# Patient Record
Sex: MALE | Race: WHITE | NOT HISPANIC OR LATINO | Employment: PART TIME | URBAN - METROPOLITAN AREA
[De-identification: names, ages, dates, MRNs, and addresses within clinical notes are randomized per-mention and may not be internally consistent; named-entity substitution may affect disease eponyms.]

---

## 2017-01-13 ENCOUNTER — OFFICE VISIT (OUTPATIENT)
Dept: INTERNAL MEDICINE | Facility: CLINIC | Age: 27
End: 2017-01-13
Payer: COMMERCIAL

## 2017-01-13 VITALS
TEMPERATURE: 96 F | WEIGHT: 224 LBS | SYSTOLIC BLOOD PRESSURE: 116 MMHG | BODY MASS INDEX: 29.55 KG/M2 | DIASTOLIC BLOOD PRESSURE: 70 MMHG

## 2017-01-13 DIAGNOSIS — H66.004 RECURRENT ACUTE SUPPURATIVE OTITIS MEDIA OF RIGHT EAR WITHOUT SPONTANEOUS RUPTURE OF TYMPANIC MEMBRANE: Primary | ICD-10-CM

## 2017-01-13 PROCEDURE — 1159F MED LIST DOCD IN RCRD: CPT | Mod: S$GLB,,, | Performed by: FAMILY MEDICINE

## 2017-01-13 PROCEDURE — 99213 OFFICE O/P EST LOW 20 MIN: CPT | Mod: S$GLB,,, | Performed by: FAMILY MEDICINE

## 2017-01-13 PROCEDURE — 99999 PR PBB SHADOW E&M-EST. PATIENT-LVL III: CPT | Mod: PBBFAC,,, | Performed by: FAMILY MEDICINE

## 2017-01-13 RX ORDER — GUAIFENESIN, PSEUDOEPHEDRINE HYDROCHLORIDE 600; 60 MG/1; MG/1
1 TABLET, EXTENDED RELEASE ORAL
COMMUNITY
End: 2019-11-11

## 2017-01-13 NOTE — PROGRESS NOTES
Subjective:   Patient ID: Vasile Norris is a 27 y.o. male.  Chief Complaint:  Follow-up    HPI Comments: Presents for follow-up of recurrent suppurative otitis media, right-sided.  Here to document resolution or if chronic abnormality referral to ENT.  Completed all medications.  Did well.  Reports ear feels back to normal.  No new issues or complaints or concerns today.  Plans to schedule full physical exam in the future.    Review of Systems   Constitutional: Negative for chills, fatigue and fever.   HENT: Negative for congestion, dental problem, drooling, ear discharge, ear pain, facial swelling, hearing loss, mouth sores, nosebleeds, postnasal drip, rhinorrhea, sinus pressure, sneezing, sore throat, tinnitus, trouble swallowing and voice change.    Eyes: Negative for discharge, redness and visual disturbance.   Neurological: Negative for dizziness.     Objective:     Visit Vitals    /70 (BP Location: Right arm, Patient Position: Sitting, BP Method: Manual)    Temp 96.1 °F (35.6 °C) (Tympanic)    Wt 101.6 kg (223 lb 15.8 oz)    BMI 29.55 kg/m2     Physical Exam   Constitutional: Vital signs are normal. He appears well-developed and well-nourished. No distress.   HENT:   Right Ear: Hearing and external ear normal. No drainage, swelling or tenderness. Tympanic membrane is scarred. Tympanic membrane is not injected, not erythematous, not retracted and not bulging. No middle ear effusion. No decreased hearing is noted.   Left Ear: Hearing, tympanic membrane, external ear and ear canal normal.   Nose: Nose normal. Right sinus exhibits no maxillary sinus tenderness and no frontal sinus tenderness. Left sinus exhibits no maxillary sinus tenderness and no frontal sinus tenderness.   Mouth/Throat: Uvula is midline, oropharynx is clear and moist and mucous membranes are normal. No tonsillar exudate.   Left external ear, EAC, and TM unremarkable.  Right external ear normal.  Right EAC with significant midline  narrowing from anatomical changes, but inflammation and irritation and drainage to resolve.  Right TM without any erythema, bulging or abnormality except for old scar from previous PE tube.   Nursing note and vitals reviewed.    Assessment:     1. Recurrent acute suppurative otitis media of right ear without spontaneous rupture of tympanic membrane      Plan:   Problem resolved.  TM back to normal except for old scar.  EAC no longer with any infection, just anatomical narrowing.  No additional treatment or intervention indicated.  Return to clinic for annual exam or as needed.

## 2017-01-13 NOTE — MR AVS SNAPSHOT
[facility]  [address] [facility] [place] [address]  Phone: [phone]  Fax: [phone]                  Vasile Norris   2017 3:00 PM   Office Visit    Description:  Male : 1990   Provider:  Edinson Dominguez MD   Department:  [facility]           Reason for Visit     Follow-up           Diagnoses this Visit        Comments    Recurrent acute suppurative otitis media of right ear without spontaneous rupture of tympanic membrane    -  Primary            To Do List           Goals (5 Years of Data)     None      Follow-Up and Disposition     Return for Annual Exam.      [facility]     [facility] -  Assistance  Registered nurses in the [facility] provide clinical advisement, health education, appointment booking, and other advisory services.  Call for this free service at [phone].             Medications           Message regarding Medications     Verify the changes and/or additions to your medication regime listed below are the same as discussed with your clinician today.  If any of these changes or additions are incorrect, please notify your healthcare provider.        STOP taking these medications     methylPREDNISolone (MEDROL DOSEPACK) 4 mg tablet Use as directed           Verify that the below list of medications is an accurate representation of the medications you are currently taking.  If none reported, the list may be blank. If incorrect, please contact your healthcare provider. Carry this list with you in case of emergency.           Current Medications     montelukast (SINGULAIR) 10 mg tablet TAKE 1 TABLET BY MOUTH DAILY    propranolol (INDERAL LA) 120 MG 24 hr capsule Take 240 mg by mouth once daily.    pseudoephedrine-guaifenesin  mg (MUCINEX D)  mg per tablet Take 1 tablet by mouth every 12 (twelve) hours.           Clinical Reference Information           Vital Signs - Last Recorded  Most  recent update: 1/13/2017  2:46 PM by Gloria Newton LPN    BP Temp Wt BMI       116/70 (BP Location: Right arm, Patient Position: Sitting, BP Method: Manual) 96.1 °F (35.6 °C) (Tympanic) 101.6 kg (223 lb 15.8 oz) 29.55 kg/m2       Blood Pressure          Most Recent Value    BP  116/70      Allergies as of 1/13/2017     Codeine    Nortriptyline      Immunizations Administered on Date of Encounter - 1/13/2017     None

## 2017-07-14 ENCOUNTER — OFFICE VISIT (OUTPATIENT)
Dept: INTERNAL MEDICINE | Facility: CLINIC | Age: 27
End: 2017-07-14
Payer: COMMERCIAL

## 2017-07-14 VITALS
WEIGHT: 229.06 LBS | HEART RATE: 69 BPM | BODY MASS INDEX: 30.36 KG/M2 | TEMPERATURE: 96 F | HEIGHT: 73 IN | DIASTOLIC BLOOD PRESSURE: 80 MMHG | OXYGEN SATURATION: 98 % | SYSTOLIC BLOOD PRESSURE: 110 MMHG

## 2017-07-14 DIAGNOSIS — J01.00 ACUTE NON-RECURRENT MAXILLARY SINUSITIS: ICD-10-CM

## 2017-07-14 PROBLEM — J32.0 MAXILLARY SINUSITIS: Status: ACTIVE | Noted: 2017-07-14

## 2017-07-14 PROCEDURE — 99999 PR PBB SHADOW E&M-EST. PATIENT-LVL III: CPT | Mod: PBBFAC,,, | Performed by: FAMILY MEDICINE

## 2017-07-14 PROCEDURE — 99214 OFFICE O/P EST MOD 30 MIN: CPT | Mod: S$GLB,,, | Performed by: FAMILY MEDICINE

## 2017-07-14 RX ORDER — TOPIRAMATE 25 MG/1
25 CAPSULE, EXTENDED RELEASE ORAL DAILY
COMMUNITY
End: 2019-11-11

## 2017-07-14 RX ORDER — AMOXICILLIN AND CLAVULANATE POTASSIUM 500; 125 MG/1; MG/1
1 TABLET, FILM COATED ORAL 2 TIMES DAILY
Qty: 20 TABLET | Refills: 0 | Status: SHIPPED | OUTPATIENT
Start: 2017-07-14 | End: 2019-11-11

## 2017-07-14 RX ORDER — METHYLPREDNISOLONE 4 MG/1
TABLET ORAL
Qty: 1 PACKAGE | Refills: 0 | Status: SHIPPED | OUTPATIENT
Start: 2017-07-14 | End: 2017-08-04

## 2017-07-14 NOTE — PROGRESS NOTES
Subjective:       Patient ID: Vasile Norris is a 27 y.o. male.    Chief Complaint: No chief complaint on file.    Sinus Congestion:       Pt reports about 1 week with nausea, postnasal drip with cough. Pt reports the cough is throughout the day and not productive. Pt has had no fever      Review of Systems   Constitutional: Negative.    HENT: Positive for postnasal drip, sinus pressure and sore throat.    Respiratory: Negative.    Cardiovascular: Negative.    Gastrointestinal: Negative.    Genitourinary: Negative.    Musculoskeletal: Negative.    Neurological: Negative.    Hematological: Negative.        Objective:      Physical Exam   Constitutional: He appears well-developed and well-nourished.   HENT:   Right Ear: Tympanic membrane is erythematous. A middle ear effusion is present.   Left Ear: Tympanic membrane is erythematous. A middle ear effusion is present.   Nose: Mucosal edema and rhinorrhea present.   Mouth/Throat: Posterior oropharyngeal erythema present.   Cardiovascular: Normal rate and regular rhythm.    Pulmonary/Chest: Effort normal and breath sounds normal.   Abdominal: Soft.   Skin: Skin is warm and dry.       Assessment:       1. Acute non-recurrent maxillary sinusitis        Plan:       Acute non-recurrent maxillary sinusitis  Comments:  Will do Augmentin 500 mg bid, Medrol dose pack. Not better in the next 7-10 return for further eval    Other orders  -     amoxicillin-clavulanate 500-125mg (AUGMENTIN) 500-125 mg Tab; Take 1 tablet (500 mg total) by mouth 2 (two) times daily.  Dispense: 20 tablet; Refill: 0  -     methylPREDNISolone (MEDROL DOSEPACK) 4 mg tablet; use as directed  Dispense: 1 Package; Refill: 0

## 2017-09-14 RX ORDER — MONTELUKAST SODIUM 10 MG/1
TABLET ORAL
Qty: 30 TABLET | Refills: 1 | OUTPATIENT
Start: 2017-09-14

## 2019-11-11 ENCOUNTER — OFFICE VISIT (OUTPATIENT)
Dept: INTERNAL MEDICINE | Facility: CLINIC | Age: 29
End: 2019-11-11
Payer: COMMERCIAL

## 2019-11-11 VITALS
BODY MASS INDEX: 29.16 KG/M2 | HEIGHT: 73 IN | SYSTOLIC BLOOD PRESSURE: 132 MMHG | TEMPERATURE: 98 F | DIASTOLIC BLOOD PRESSURE: 88 MMHG | OXYGEN SATURATION: 97 % | WEIGHT: 220 LBS | HEART RATE: 105 BPM

## 2019-11-11 DIAGNOSIS — I10 ESSENTIAL HYPERTENSION: Primary | ICD-10-CM

## 2019-11-11 DIAGNOSIS — J30.89 NON-SEASONAL ALLERGIC RHINITIS, UNSPECIFIED TRIGGER: ICD-10-CM

## 2019-11-11 PROBLEM — J32.0 MAXILLARY SINUSITIS: Status: RESOLVED | Noted: 2017-07-14 | Resolved: 2019-11-11

## 2019-11-11 PROCEDURE — 99999 PR PBB SHADOW E&M-EST. PATIENT-LVL III: CPT | Mod: PBBFAC,,, | Performed by: FAMILY MEDICINE

## 2019-11-11 PROCEDURE — 99214 PR OFFICE/OUTPT VISIT, EST, LEVL IV, 30-39 MIN: ICD-10-PCS | Mod: S$GLB,,, | Performed by: FAMILY MEDICINE

## 2019-11-11 PROCEDURE — 99999 PR PBB SHADOW E&M-EST. PATIENT-LVL III: ICD-10-PCS | Mod: PBBFAC,,, | Performed by: FAMILY MEDICINE

## 2019-11-11 PROCEDURE — 99214 OFFICE O/P EST MOD 30 MIN: CPT | Mod: S$GLB,,, | Performed by: FAMILY MEDICINE

## 2019-11-11 RX ORDER — MONTELUKAST SODIUM 10 MG/1
10 TABLET ORAL NIGHTLY
Qty: 90 TABLET | Refills: 3 | Status: SHIPPED | OUTPATIENT
Start: 2019-11-11 | End: 2020-07-15 | Stop reason: SDUPTHER

## 2019-11-11 RX ORDER — AMLODIPINE BESYLATE 5 MG/1
5 TABLET ORAL DAILY
Qty: 90 TABLET | Refills: 3 | Status: SHIPPED | OUTPATIENT
Start: 2019-11-11 | End: 2020-07-02 | Stop reason: SDUPTHER

## 2019-11-11 RX ORDER — AMLODIPINE BESYLATE 5 MG/1
TABLET ORAL
COMMUNITY
End: 2019-11-11 | Stop reason: SDUPTHER

## 2019-11-11 NOTE — PROGRESS NOTES
Subjective:   Patient ID:  Vasile Norris is a 29 y.o. male.    Chief Complaint:  Establish Care (previous PCP increased BP medication.)    Past Medical History:   Diagnosis Date    Allergy     Hypertension     Kidney stones     Migraines      Past Surgical History:   Procedure Laterality Date    kidney stone removal      LITHOTRIPSY Right     nail avulsion      PILONIDAL CYST DRAINAGE      WISDOM TOOTH EXTRACTION       Family History   Problem Relation Age of Onset    Hypertension Father     Prostate cancer Father     Seizures Mother     Colon cancer Neg Hx      Review of patient's allergies indicates:   Allergen Reactions    Codeine Hives    Nortriptyline Palpitations    Topiramate Other (See Comments)       Current Outpatient Medications:     amLODIPine (NORVASC) 5 MG tablet, Take 1 tablet (5 mg total) by mouth once daily., Disp: 90 tablet, Rfl: 3    montelukast (SINGULAIR) 10 mg tablet, Take 1 tablet (10 mg total) by mouth every evening., Disp: 90 tablet, Rfl: 3    Patient presents to establish care.    Needs follow-up on hypertension.    Recently really located from [place].  Prior to moving had amlodipine increased to 5 mg daily.  Reports compliance.  Denies side effects.  Blood pressure improved.  States lipid panel done in [place] normal.      History also significant for  Allergic Rhinosinusitis.  On Singulair 10 mg daily.  Symptoms controlled  Migraine headaches.  Seeing Neurology.  On Botox.    Recurrent kidney stones.  Seeing Urology.  Presently stable.    Reports tetanus booster 3 months ago.  No recent flu vaccine.    No specific complaints concerns today.    Hypertension   This is a chronic problem. The current episode started more than 1 month ago. The problem has been gradually improving since onset. The problem is controlled. Pertinent negatives include no anxiety, blurred vision, chest pain, headaches, malaise/fatigue, neck pain, orthopnea, palpitations, peripheral edema,  "PND, shortness of breath or sweats. Agents associated with hypertension include NSAIDs. Risk factors for coronary artery disease include male gender. Past treatments include calcium channel blockers. The current treatment provides moderate improvement. There are no compliance problems.  There is no history of angina, kidney disease, CAD/MI, CVA, heart failure, left ventricular hypertrophy, PVD or retinopathy.     Review of Systems   Constitutional: Negative for chills, fatigue, fever and malaise/fatigue.   HENT: Negative for congestion, dental problem, drooling, ear discharge, ear pain, facial swelling, hearing loss, mouth sores, nosebleeds, postnasal drip, rhinorrhea, sinus pressure, sinus pain, sneezing, sore throat, tinnitus, trouble swallowing and voice change.    Eyes: Negative for blurred vision, discharge, redness and visual disturbance.   Respiratory: Negative for cough, chest tightness, shortness of breath and wheezing.    Cardiovascular: Negative for chest pain, palpitations, orthopnea, leg swelling and PND.   Gastrointestinal: Negative for abdominal pain, diarrhea, nausea and vomiting.   Genitourinary: Negative for difficulty urinating.   Musculoskeletal: Negative for myalgias and neck pain.   Skin: Negative for rash.   Neurological: Negative for dizziness, syncope, weakness, light-headedness and headaches.   Hematological: Negative for adenopathy.   Psychiatric/Behavioral: Negative for sleep disturbance.       Objective:   /88 (BP Location: Left arm, Patient Position: Sitting, BP Method: Medium (Manual))   Pulse 105   Temp 98.1 °F (36.7 °C) (Oral)   Ht 6' 1" (1.854 m)   Wt 99.8 kg (220 lb 0.3 oz)   SpO2 97%   BMI 29.03 kg/m²     Physical Exam   Constitutional: Vital signs are normal. He appears well-developed and well-nourished.   Neck: No JVD present.   Cardiovascular: Normal rate, regular rhythm and normal heart sounds. Exam reveals no gallop and no friction rub.   No murmur " heard.  Pulmonary/Chest: Effort normal and breath sounds normal. He has no wheezes. He has no rhonchi. He has no rales.   Abdominal: Soft. He exhibits no distension. There is no tenderness.   Musculoskeletal: He exhibits no edema.   Skin: Skin is warm, dry and intact. No rash noted.   Psychiatric: He has a normal mood and affect.   Nursing note and vitals reviewed.    Assessment:     1. Essential hypertension    2. Non-seasonal allergic rhinitis, unspecified trigger      Plan:   Essential hypertension  -     amLODIPine (NORVASC) 5 MG tablet; Take 1 tablet (5 mg total) by mouth once daily.  Dispense: 90 tablet; Refill: 3  Controlled.  BP goal.    Continue amlodipine 5 mg daily.      Non-seasonal allergic rhinitis, unspecified trigger  -     montelukast (SINGULAIR) 10 mg tablet; Take 1 tablet (10 mg total) by mouth every evening.  Dispense: 90 tablet; Refill: 3  Controlled.  Symptoms stable.    Continue Singulair 10 mg daily.      Declines flu vaccine  Follow up with all specialists as scheduled.    Return to clinic 9 months for annual physical exam or sooner as needed.

## 2020-06-10 ENCOUNTER — OFFICE VISIT (OUTPATIENT)
Dept: INTERNAL MEDICINE | Facility: CLINIC | Age: 30
End: 2020-06-10
Payer: COMMERCIAL

## 2020-06-10 DIAGNOSIS — B35.6 TINEA CRURIS: Primary | ICD-10-CM

## 2020-06-10 PROBLEM — G89.29 CHRONIC BILATERAL LOW BACK PAIN WITH BILATERAL SCIATICA: Status: ACTIVE | Noted: 2019-06-17

## 2020-06-10 PROBLEM — M54.42 CHRONIC BILATERAL LOW BACK PAIN WITH BILATERAL SCIATICA: Status: ACTIVE | Noted: 2019-06-17

## 2020-06-10 PROBLEM — I10 BENIGN HYPERTENSION: Status: ACTIVE | Noted: 2019-10-11

## 2020-06-10 PROBLEM — M54.41 CHRONIC BILATERAL LOW BACK PAIN WITH BILATERAL SCIATICA: Status: ACTIVE | Noted: 2019-06-17

## 2020-06-10 PROBLEM — G43.009 MIGRAINE WITHOUT AURA AND WITHOUT STATUS MIGRAINOSUS, NOT INTRACTABLE: Status: ACTIVE | Noted: 2018-09-13

## 2020-06-10 PROCEDURE — 99213 PR OFFICE/OUTPT VISIT, EST, LEVL III, 20-29 MIN: ICD-10-PCS | Mod: 95,,, | Performed by: FAMILY MEDICINE

## 2020-06-10 PROCEDURE — 99213 OFFICE O/P EST LOW 20 MIN: CPT | Mod: 95,,, | Performed by: FAMILY MEDICINE

## 2020-06-10 RX ORDER — CLOTRIMAZOLE AND BETAMETHASONE DIPROPIONATE 10; .64 MG/G; MG/G
CREAM TOPICAL 2 TIMES DAILY
Qty: 45 G | Refills: 0 | Status: SHIPPED | OUTPATIENT
Start: 2020-06-10 | End: 2021-01-06

## 2020-06-10 RX ORDER — POTASSIUM CITRATE 10 MEQ/1
1 TABLET, EXTENDED RELEASE ORAL DAILY
COMMUNITY
Start: 2020-05-30

## 2020-06-10 RX ORDER — ONABOTULINUMTOXINA 200 [USP'U]/1
INJECTION, POWDER, LYOPHILIZED, FOR SOLUTION INTRADERMAL; INTRAMUSCULAR
COMMUNITY
Start: 2020-03-13

## 2020-06-10 NOTE — PATIENT INSTRUCTIONS
Jock Itch (Tinea Cruris, General)  Jock itch (tinea cruris) is a red, itchy rash in the groin caused by a fungal infection. It occurs in skin folds where it is warm and moist. It commonly starts as a small, red, itchy patch that grows larger. The patch is usually in the shape of a round ring, 1 to 2 inches wide. It may cause the skin to flake. It may also spread to the scrotum or the skin that covers your testicles. This infection is treated with skin creams or oral medicine.  Home care  · If you were prescribed a cream, use it exactly as directed. You can buy some antifungal creams without a prescription.  · It may take a week before the fungus starts to go away. It can take about 2 to 3 weeks to completely clear. To stop the rash from coming back, keep using the medicine until the rash is all gone.  · Wash the area at least once a day with soap and water. Pat dry and apply medicine.   · Wear loose-fitting underwear to let your skin breathe. Change your underwear daily.  · Once the rash is gone, keep the area clean and dry to prevent reinfection. If recurrence is a problem, use a medicated antifungal powder daily. This is available over the counter.  Prevention  The following tips may help prevent jock itch:  · Don't share clothes, towels, or sports gear with others unless they have been washed.  · Change your underwear daily.  · Keep skin clean and dry, especially after showering or swimming.  · Lose weight.  · Do not wear tight underwear.  · Treat athlete's foot if it occurs.  Follow-up care  Follow up with your healthcare provider, or as advised. Call your provider if the rash is not starting to improve after 10 days of treatment, or if the rash continues to spread.  When to seek medical advice  Call your healthcare provider right away if any of these occur:  · Increasing pain in the rash area  · Redness that spreads around the rash  · Fluid draining from the rash  · Rash returns soon after treatment  · Fever  of 100.4°F (38°C) or higher, or as directed by your provider  Date Last Reviewed: 8/1/2016  © 0756-4790 [employer] [address] [place] [address], [place] [address]. All rights reserved. This information is not intended as a substitute for professional medical care. Always follow your healthcare professional's instructions.

## 2020-06-10 NOTE — PROGRESS NOTES
Subjective:   Patient ID: Vasile Norris is a 30 y.o. male.  Chief Complaint:  No chief complaint on file.    The patient location is: Home  The chief complaint leading to consultation is: Rash    Visit type: audiovisual    Face to Face time with patient: 15 minutes  20 minutes of total time spent on the encounter, which includes face to face time and non-face to face time preparing to see the patient (eg, review of tests), Obtaining and/or reviewing separately obtained history, Documenting clinical information in the electronic or other health record, Independently interpreting results (not separately reported) and communicating results to the patient/family/caregiver, or Care coordination (not separately reported).     Each patient to whom he or she provides medical services by telemedicine is:  (1) informed of the relationship between the physician and patient and the respective role of any other health care provider with respect to management of the patient; and (2) notified that he or she may decline to receive medical services by telemedicine and may withdraw from such care at any time.    Complains of rash bilateral groin.    Two weeks duration.  Not responding to Aquaphor and cool environment.  Has not tried anti fungal.    Involves the skin folds.    Somewhat itchy.    No skin breakdown.      Rash   This is a new problem. The current episode started 1 to 4 weeks ago. The problem is unchanged. The affected locations include the groingroin. The rash is characterized by redness, itchiness and scaling. He was exposed to nothing. Pertinent negatives include no anorexia, congestion, cough, diarrhea, eye pain, facial edema, fatigue, fever, joint pain, nail changes, rhinorrhea, shortness of breath, sore throat or vomiting. Past treatments include anti-itch cream and moisturizer. The treatment provided no relief. His past medical history is significant for allergies and varicella. There is no history of asthma or  eczema.       Current Outpatient Medications:     amLODIPine (NORVASC) 5 MG tablet, Take 1 tablet (5 mg total) by mouth once daily., Disp: 90 tablet, Rfl: 3    BOTOX 200 unit SolR, , Disp: , Rfl:     clotrimazole-betamethasone 1-0.05% (LOTRISONE) cream, Apply topically 2 (two) times daily., Disp: 45 g, Rfl: 0    montelukast (SINGULAIR) 10 mg tablet, Take 1 tablet (10 mg total) by mouth every evening., Disp: 90 tablet, Rfl: 3    potassium citrate (UROCIT-K) 10 mEq (1,080 mg) TbSR, Take 1 tablet by mouth once daily., Disp: , Rfl:     Review of Systems   Constitutional: Negative for chills, fatigue and fever.   HENT: Negative for congestion, postnasal drip, rhinorrhea, sinus pain and sore throat.    Eyes: Negative for pain, discharge, redness and itching.   Respiratory: Negative for cough and shortness of breath.    Cardiovascular: Negative for chest pain and leg swelling.   Gastrointestinal: Negative for abdominal pain, anorexia, diarrhea, nausea and vomiting.   Genitourinary: Negative for difficulty urinating.   Musculoskeletal: Negative for back pain, joint pain and myalgias.   Skin: Positive for rash. Negative for nail changes.   Psychiatric/Behavioral: Negative for sleep disturbance. The patient is not nervous/anxious.      Objective:   Physical Exam   Constitutional: He is oriented to person, place, and time. He appears well-developed and well-nourished.  Non-toxic appearance. He does not have a sickly appearance. He does not appear ill. No distress.   Eyes: Conjunctivae are normal. Right eye exhibits no discharge. Left eye exhibits no discharge. Right conjunctiva is not injected. Left conjunctiva is not injected. No scleral icterus.   Pulmonary/Chest: Effort normal. No accessory muscle usage. No tachypnea. No respiratory distress.   Musculoskeletal: He exhibits no edema.   Neurological: He is alert and oriented to person, place, and time.   Psychiatric: He has a normal mood and affect. Judgment normal.      Assessment:       ICD-10-CM ICD-9-CM   1. Tinea cruris B35.6 110.3     Plan:   Tinea cruris  -     clotrimazole-betamethasone 1-0.05% (LOTRISONE) cream; Apply topically 2 (two) times daily.  Dispense: 45 g; Refill: 0    Probable tinea cruris.    Patient education / handout through AVS.    Lotrisone topically twice a day  If no significant improvement or immediate recurrence, consider treatment with oral anti fungal.    Return to clinic as needed.

## 2020-07-02 DIAGNOSIS — I10 ESSENTIAL HYPERTENSION: ICD-10-CM

## 2020-07-06 RX ORDER — AMLODIPINE BESYLATE 5 MG/1
5 TABLET ORAL DAILY
Qty: 90 TABLET | Refills: 3 | Status: SHIPPED | OUTPATIENT
Start: 2020-07-06 | End: 2021-02-09

## 2020-07-15 DIAGNOSIS — J30.89 NON-SEASONAL ALLERGIC RHINITIS, UNSPECIFIED TRIGGER: ICD-10-CM

## 2020-07-15 RX ORDER — MONTELUKAST SODIUM 10 MG/1
10 TABLET ORAL NIGHTLY
Qty: 90 TABLET | Refills: 3 | Status: SHIPPED | OUTPATIENT
Start: 2020-07-15 | End: 2020-10-01 | Stop reason: SDUPTHER

## 2020-10-01 DIAGNOSIS — J30.89 NON-SEASONAL ALLERGIC RHINITIS, UNSPECIFIED TRIGGER: ICD-10-CM

## 2020-10-01 RX ORDER — MONTELUKAST SODIUM 10 MG/1
10 TABLET ORAL NIGHTLY
Qty: 90 TABLET | Refills: 3 | Status: SHIPPED | OUTPATIENT
Start: 2020-10-01 | End: 2021-10-25

## 2020-11-03 ENCOUNTER — PATIENT OUTREACH (OUTPATIENT)
Dept: ADMINISTRATIVE | Facility: HOSPITAL | Age: 30
End: 2020-11-03

## 2020-11-03 NOTE — PROGRESS NOTES
[employer] HTN Report. Patient notified to obtain a home BP reading. Patient states his BP on 11/01/2020 was 143/89. Patient states this is normal for him. Offered to schedule office visit, patient states he will call back to schedule after looking at work schedule.

## 2021-01-06 ENCOUNTER — LAB VISIT (OUTPATIENT)
Dept: LAB | Facility: HOSPITAL | Age: 31
End: 2021-01-06
Attending: FAMILY MEDICINE
Payer: COMMERCIAL

## 2021-01-06 ENCOUNTER — OFFICE VISIT (OUTPATIENT)
Dept: INTERNAL MEDICINE | Facility: CLINIC | Age: 31
End: 2021-01-06
Payer: COMMERCIAL

## 2021-01-06 VITALS
DIASTOLIC BLOOD PRESSURE: 86 MMHG | OXYGEN SATURATION: 97 % | TEMPERATURE: 98 F | SYSTOLIC BLOOD PRESSURE: 136 MMHG | HEIGHT: 73 IN | BODY MASS INDEX: 31.49 KG/M2 | HEART RATE: 103 BPM | WEIGHT: 237.63 LBS

## 2021-01-06 DIAGNOSIS — Z00.00 ANNUAL PHYSICAL EXAM: ICD-10-CM

## 2021-01-06 DIAGNOSIS — E66.9 OBESITY (BMI 30.0-34.9): ICD-10-CM

## 2021-01-06 DIAGNOSIS — J30.89 NON-SEASONAL ALLERGIC RHINITIS, UNSPECIFIED TRIGGER: Chronic | ICD-10-CM

## 2021-01-06 DIAGNOSIS — Z00.00 ANNUAL PHYSICAL EXAM: Primary | ICD-10-CM

## 2021-01-06 DIAGNOSIS — Z11.4 SCREENING FOR HIV (HUMAN IMMUNODEFICIENCY VIRUS): ICD-10-CM

## 2021-01-06 DIAGNOSIS — N20.0 KIDNEY STONES: Chronic | ICD-10-CM

## 2021-01-06 DIAGNOSIS — I10 ESSENTIAL HYPERTENSION: Chronic | ICD-10-CM

## 2021-01-06 DIAGNOSIS — G43.009 MIGRAINE WITHOUT AURA AND WITHOUT STATUS MIGRAINOSUS, NOT INTRACTABLE: Chronic | ICD-10-CM

## 2021-01-06 DIAGNOSIS — Z11.59 NEED FOR HEPATITIS C SCREENING TEST: ICD-10-CM

## 2021-01-06 LAB
ALBUMIN SERPL BCP-MCNC: 4.1 G/DL (ref 3.5–5.2)
ALP SERPL-CCNC: 82 U/L (ref 55–135)
ALT SERPL W/O P-5'-P-CCNC: 19 U/L (ref 10–44)
ANION GAP SERPL CALC-SCNC: 12 MMOL/L (ref 8–16)
AST SERPL-CCNC: 21 U/L (ref 10–40)
BILIRUB SERPL-MCNC: 0.7 MG/DL (ref 0.1–1)
BUN SERPL-MCNC: 12 MG/DL (ref 6–20)
CALCIUM SERPL-MCNC: 9.4 MG/DL (ref 8.7–10.5)
CHLORIDE SERPL-SCNC: 105 MMOL/L (ref 95–110)
CHOLEST SERPL-MCNC: 182 MG/DL (ref 120–199)
CHOLEST/HDLC SERPL: 5.1 {RATIO} (ref 2–5)
CO2 SERPL-SCNC: 26 MMOL/L (ref 23–29)
CREAT SERPL-MCNC: 1 MG/DL (ref 0.5–1.4)
ERYTHROCYTE [DISTWIDTH] IN BLOOD BY AUTOMATED COUNT: 12.2 % (ref 11.5–14.5)
EST. GFR  (AFRICAN AMERICAN): >60 ML/MIN/1.73 M^2
EST. GFR  (NON AFRICAN AMERICAN): >60 ML/MIN/1.73 M^2
GLUCOSE SERPL-MCNC: 93 MG/DL (ref 70–110)
HCT VFR BLD AUTO: 50.5 % (ref 40–54)
HDLC SERPL-MCNC: 36 MG/DL (ref 40–75)
HDLC SERPL: 19.8 % (ref 20–50)
HGB BLD-MCNC: 16.3 G/DL (ref 14–18)
LDLC SERPL CALC-MCNC: 122.8 MG/DL (ref 63–159)
MCH RBC QN AUTO: 27.7 PG (ref 27–31)
MCHC RBC AUTO-ENTMCNC: 32.3 G/DL (ref 32–36)
MCV RBC AUTO: 86 FL (ref 82–98)
NONHDLC SERPL-MCNC: 146 MG/DL
PLATELET # BLD AUTO: 276 K/UL (ref 150–350)
PMV BLD AUTO: 10.9 FL (ref 9.2–12.9)
POTASSIUM SERPL-SCNC: 3.6 MMOL/L (ref 3.5–5.1)
PROT SERPL-MCNC: 7.3 G/DL (ref 6–8.4)
RBC # BLD AUTO: 5.88 M/UL (ref 4.6–6.2)
SODIUM SERPL-SCNC: 143 MMOL/L (ref 136–145)
TRIGL SERPL-MCNC: 116 MG/DL (ref 30–150)
TSH SERPL DL<=0.005 MIU/L-ACNC: 0.81 UIU/ML (ref 0.4–4)
WBC # BLD AUTO: 5.89 K/UL (ref 3.9–12.7)

## 2021-01-06 PROCEDURE — 3075F PR MOST RECENT SYSTOLIC BLOOD PRESS GE 130-139MM HG: ICD-10-PCS | Mod: CPTII,S$GLB,, | Performed by: FAMILY MEDICINE

## 2021-01-06 PROCEDURE — 99395 PR PREVENTIVE VISIT,EST,18-39: ICD-10-PCS | Mod: S$GLB,,, | Performed by: FAMILY MEDICINE

## 2021-01-06 PROCEDURE — 99999 PR PBB SHADOW E&M-EST. PATIENT-LVL IV: ICD-10-PCS | Mod: PBBFAC,,, | Performed by: FAMILY MEDICINE

## 2021-01-06 PROCEDURE — 3079F DIAST BP 80-89 MM HG: CPT | Mod: CPTII,S$GLB,, | Performed by: FAMILY MEDICINE

## 2021-01-06 PROCEDURE — 86703 HIV-1/HIV-2 1 RESULT ANTBDY: CPT

## 2021-01-06 PROCEDURE — 3079F PR MOST RECENT DIASTOLIC BLOOD PRESSURE 80-89 MM HG: ICD-10-PCS | Mod: CPTII,S$GLB,, | Performed by: FAMILY MEDICINE

## 2021-01-06 PROCEDURE — 3075F SYST BP GE 130 - 139MM HG: CPT | Mod: CPTII,S$GLB,, | Performed by: FAMILY MEDICINE

## 2021-01-06 PROCEDURE — 86803 HEPATITIS C AB TEST: CPT

## 2021-01-06 PROCEDURE — 99999 PR PBB SHADOW E&M-EST. PATIENT-LVL IV: CPT | Mod: PBBFAC,,, | Performed by: FAMILY MEDICINE

## 2021-01-06 PROCEDURE — 80061 LIPID PANEL: CPT

## 2021-01-06 PROCEDURE — 99395 PREV VISIT EST AGE 18-39: CPT | Mod: S$GLB,,, | Performed by: FAMILY MEDICINE

## 2021-01-06 PROCEDURE — 85027 COMPLETE CBC AUTOMATED: CPT

## 2021-01-06 PROCEDURE — 84443 ASSAY THYROID STIM HORMONE: CPT

## 2021-01-06 PROCEDURE — 80053 COMPREHEN METABOLIC PANEL: CPT

## 2021-01-06 PROCEDURE — 36415 COLL VENOUS BLD VENIPUNCTURE: CPT

## 2021-01-06 RX ORDER — ONDANSETRON 4 MG/1
TABLET, FILM COATED ORAL
COMMUNITY
Start: 2020-12-29 | End: 2024-03-05

## 2021-01-07 LAB
HCV AB SERPL QL IA: NEGATIVE
HIV 1+2 AB+HIV1 P24 AG SERPL QL IA: NEGATIVE

## 2021-01-12 ENCOUNTER — PATIENT MESSAGE (OUTPATIENT)
Dept: INTERNAL MEDICINE | Facility: CLINIC | Age: 31
End: 2021-01-12

## 2021-01-12 DIAGNOSIS — B35.6 TINEA CRURIS: ICD-10-CM

## 2021-01-12 RX ORDER — CLOTRIMAZOLE AND BETAMETHASONE DIPROPIONATE 10; .64 MG/G; MG/G
CREAM TOPICAL 2 TIMES DAILY
Qty: 45 G | Refills: 0 | Status: SHIPPED | OUTPATIENT
Start: 2021-01-12 | End: 2021-09-20

## 2021-01-20 ENCOUNTER — TELEPHONE (OUTPATIENT)
Dept: INTERNAL MEDICINE | Facility: CLINIC | Age: 31
End: 2021-01-20

## 2021-02-09 ENCOUNTER — PATIENT MESSAGE (OUTPATIENT)
Dept: INTERNAL MEDICINE | Facility: CLINIC | Age: 31
End: 2021-02-09

## 2021-02-09 DIAGNOSIS — I10 ESSENTIAL HYPERTENSION: Primary | ICD-10-CM

## 2021-02-09 RX ORDER — AMLODIPINE BESYLATE 10 MG/1
10 TABLET ORAL DAILY
Qty: 90 TABLET | Refills: 0 | Status: SHIPPED | OUTPATIENT
Start: 2021-02-09 | End: 2021-02-23

## 2021-02-15 ENCOUNTER — PATIENT MESSAGE (OUTPATIENT)
Dept: INTERNAL MEDICINE | Facility: CLINIC | Age: 31
End: 2021-02-15

## 2021-02-23 DIAGNOSIS — I10 ESSENTIAL HYPERTENSION: Primary | ICD-10-CM

## 2021-02-23 RX ORDER — AMLODIPINE AND OLMESARTAN MEDOXOMIL 5; 20 MG/1; MG/1
1 TABLET ORAL DAILY
Qty: 30 TABLET | Refills: 0 | Status: SHIPPED | OUTPATIENT
Start: 2021-02-23 | End: 2021-02-24 | Stop reason: SDUPTHER

## 2021-02-24 ENCOUNTER — PATIENT MESSAGE (OUTPATIENT)
Dept: INTERNAL MEDICINE | Facility: CLINIC | Age: 31
End: 2021-02-24

## 2021-02-24 DIAGNOSIS — I10 ESSENTIAL HYPERTENSION: ICD-10-CM

## 2021-02-24 RX ORDER — AMLODIPINE AND OLMESARTAN MEDOXOMIL 5; 20 MG/1; MG/1
1 TABLET ORAL DAILY
Qty: 30 TABLET | Refills: 0 | Status: SHIPPED | OUTPATIENT
Start: 2021-02-24 | End: 2021-03-22

## 2021-08-06 ENCOUNTER — PATIENT OUTREACH (OUTPATIENT)
Dept: ADMINISTRATIVE | Facility: OTHER | Age: 31
End: 2021-08-06

## 2021-08-06 ENCOUNTER — OFFICE VISIT (OUTPATIENT)
Dept: PODIATRY | Facility: CLINIC | Age: 31
End: 2021-08-06
Payer: COMMERCIAL

## 2021-08-06 VITALS — HEIGHT: 73 IN | BODY MASS INDEX: 31.49 KG/M2 | WEIGHT: 237.63 LBS

## 2021-08-06 DIAGNOSIS — M62.462 GASTROCNEMIUS EQUINUS OF LEFT LOWER EXTREMITY: ICD-10-CM

## 2021-08-06 DIAGNOSIS — M72.2 PLANTAR FASCIITIS: Primary | ICD-10-CM

## 2021-08-06 DIAGNOSIS — M62.461 GASTROCNEMIUS EQUINUS OF RIGHT LOWER EXTREMITY: ICD-10-CM

## 2021-08-06 PROCEDURE — 99204 PR OFFICE/OUTPT VISIT, NEW, LEVL IV, 45-59 MIN: ICD-10-PCS | Mod: 25,S$GLB,, | Performed by: PODIATRIST

## 2021-08-06 PROCEDURE — 20550 NJX 1 TENDON SHEATH/LIGAMENT: CPT | Mod: LT,S$GLB,, | Performed by: PODIATRIST

## 2021-08-06 PROCEDURE — 99999 PR PBB SHADOW E&M-EST. PATIENT-LVL III: ICD-10-PCS | Mod: PBBFAC,,, | Performed by: PODIATRIST

## 2021-08-06 PROCEDURE — 20550 PR INJECT TENDON SHEATH/LIGAMENT: ICD-10-PCS | Mod: LT,S$GLB,, | Performed by: PODIATRIST

## 2021-08-06 PROCEDURE — 99204 OFFICE O/P NEW MOD 45 MIN: CPT | Mod: 25,S$GLB,, | Performed by: PODIATRIST

## 2021-08-06 PROCEDURE — 99999 PR PBB SHADOW E&M-EST. PATIENT-LVL III: CPT | Mod: PBBFAC,,, | Performed by: PODIATRIST

## 2021-08-06 RX ORDER — MELOXICAM 15 MG/1
TABLET ORAL
Qty: 30 TABLET | Refills: 0 | Status: SHIPPED | OUTPATIENT
Start: 2021-08-06 | End: 2021-11-03

## 2021-08-06 RX ORDER — TRIAMCINOLONE ACETONIDE 40 MG/ML
40 INJECTION, SUSPENSION INTRA-ARTICULAR; INTRAMUSCULAR ONCE
Status: COMPLETED | OUTPATIENT
Start: 2021-08-06 | End: 2021-08-06

## 2021-08-06 RX ADMIN — TRIAMCINOLONE ACETONIDE 40 MG: 40 INJECTION, SUSPENSION INTRA-ARTICULAR; INTRAMUSCULAR at 01:08

## 2021-08-25 ENCOUNTER — PATIENT MESSAGE (OUTPATIENT)
Dept: DERMATOLOGY | Facility: CLINIC | Age: 31
End: 2021-08-25

## 2021-09-10 ENCOUNTER — OFFICE VISIT (OUTPATIENT)
Dept: DERMATOLOGY | Facility: CLINIC | Age: 31
End: 2021-09-10
Payer: COMMERCIAL

## 2021-09-10 DIAGNOSIS — D48.5 NEOPLASM OF UNCERTAIN BEHAVIOR OF SKIN: Primary | ICD-10-CM

## 2021-09-10 DIAGNOSIS — L81.4 SOLAR LENTIGO: ICD-10-CM

## 2021-09-10 DIAGNOSIS — L82.1 SEBORRHEIC KERATOSES: ICD-10-CM

## 2021-09-10 PROCEDURE — 11102 TANGNTL BX SKIN SINGLE LES: CPT | Mod: S$GLB,,, | Performed by: STUDENT IN AN ORGANIZED HEALTH CARE EDUCATION/TRAINING PROGRAM

## 2021-09-10 PROCEDURE — 88305 TISSUE EXAM BY PATHOLOGIST: CPT | Performed by: PATHOLOGY

## 2021-09-10 PROCEDURE — 88305 TISSUE EXAM BY PATHOLOGIST: CPT | Mod: 26,,, | Performed by: PATHOLOGY

## 2021-09-10 PROCEDURE — 11102 PR TANGENTIAL BIOPSY, SKIN, SINGLE LESION: ICD-10-PCS | Mod: S$GLB,,, | Performed by: STUDENT IN AN ORGANIZED HEALTH CARE EDUCATION/TRAINING PROGRAM

## 2021-09-10 PROCEDURE — 99999 PR PBB SHADOW E&M-EST. PATIENT-LVL III: CPT | Mod: PBBFAC,,, | Performed by: STUDENT IN AN ORGANIZED HEALTH CARE EDUCATION/TRAINING PROGRAM

## 2021-09-10 PROCEDURE — 88305 TISSUE EXAM BY PATHOLOGIST: ICD-10-PCS | Mod: 26,,, | Performed by: PATHOLOGY

## 2021-09-10 PROCEDURE — 99203 OFFICE O/P NEW LOW 30 MIN: CPT | Mod: 25,S$GLB,, | Performed by: STUDENT IN AN ORGANIZED HEALTH CARE EDUCATION/TRAINING PROGRAM

## 2021-09-10 PROCEDURE — 99999 PR PBB SHADOW E&M-EST. PATIENT-LVL III: ICD-10-PCS | Mod: PBBFAC,,, | Performed by: STUDENT IN AN ORGANIZED HEALTH CARE EDUCATION/TRAINING PROGRAM

## 2021-09-10 PROCEDURE — 99203 PR OFFICE/OUTPT VISIT, NEW, LEVL III, 30-44 MIN: ICD-10-PCS | Mod: 25,S$GLB,, | Performed by: STUDENT IN AN ORGANIZED HEALTH CARE EDUCATION/TRAINING PROGRAM

## 2021-09-10 RX ORDER — CIPROFLOXACIN AND DEXAMETHASONE 3; 1 MG/ML; MG/ML
SUSPENSION/ DROPS AURICULAR (OTIC)
COMMUNITY
Start: 2021-06-10 | End: 2021-11-03

## 2021-09-10 RX ORDER — AMOXICILLIN AND CLAVULANATE POTASSIUM 562.5; 437.5; 62.5 MG/1; MG/1; MG/1
1 TABLET, FILM COATED, EXTENDED RELEASE ORAL 2 TIMES DAILY
COMMUNITY
Start: 2021-06-11 | End: 2021-11-03

## 2021-09-10 RX ORDER — SUMATRIPTAN SUCCINATE 100 MG/1
TABLET ORAL
COMMUNITY
Start: 2021-03-31 | End: 2022-02-08

## 2021-09-10 RX ORDER — LEVOCETIRIZINE DIHYDROCHLORIDE 5 MG/1
TABLET, FILM COATED ORAL
COMMUNITY
Start: 2021-06-15 | End: 2021-11-03

## 2021-09-14 LAB
FINAL PATHOLOGIC DIAGNOSIS: NORMAL
GROSS: NORMAL
Lab: NORMAL
MICROSCOPIC EXAM: NORMAL

## 2021-09-17 ENCOUNTER — PATIENT OUTREACH (OUTPATIENT)
Dept: ADMINISTRATIVE | Facility: OTHER | Age: 31
End: 2021-09-17

## 2021-09-19 ENCOUNTER — PATIENT MESSAGE (OUTPATIENT)
Dept: PODIATRY | Facility: CLINIC | Age: 31
End: 2021-09-19

## 2021-09-20 ENCOUNTER — OFFICE VISIT (OUTPATIENT)
Dept: PODIATRY | Facility: CLINIC | Age: 31
End: 2021-09-20
Payer: COMMERCIAL

## 2021-09-20 VITALS — BODY MASS INDEX: 31.44 KG/M2 | WEIGHT: 237.19 LBS | HEIGHT: 73 IN

## 2021-09-20 DIAGNOSIS — M72.2 PLANTAR FASCIITIS: Primary | ICD-10-CM

## 2021-09-20 DIAGNOSIS — M62.462 GASTROCNEMIUS EQUINUS OF LEFT LOWER EXTREMITY: ICD-10-CM

## 2021-09-20 PROCEDURE — 99213 PR OFFICE/OUTPT VISIT, EST, LEVL III, 20-29 MIN: ICD-10-PCS | Mod: S$GLB,,, | Performed by: PODIATRIST

## 2021-09-20 PROCEDURE — 99213 OFFICE O/P EST LOW 20 MIN: CPT | Mod: S$GLB,,, | Performed by: PODIATRIST

## 2021-09-20 PROCEDURE — 99999 PR PBB SHADOW E&M-EST. PATIENT-LVL III: ICD-10-PCS | Mod: PBBFAC,,, | Performed by: PODIATRIST

## 2021-09-20 PROCEDURE — 99999 PR PBB SHADOW E&M-EST. PATIENT-LVL III: CPT | Mod: PBBFAC,,, | Performed by: PODIATRIST

## 2021-11-03 ENCOUNTER — OFFICE VISIT (OUTPATIENT)
Dept: INTERNAL MEDICINE | Facility: CLINIC | Age: 31
End: 2021-11-03
Payer: COMMERCIAL

## 2021-11-03 ENCOUNTER — TELEPHONE (OUTPATIENT)
Dept: PULMONOLOGY | Facility: CLINIC | Age: 31
End: 2021-11-03
Payer: COMMERCIAL

## 2021-11-03 VITALS
TEMPERATURE: 99 F | OXYGEN SATURATION: 96 % | BODY MASS INDEX: 31.18 KG/M2 | WEIGHT: 235.25 LBS | DIASTOLIC BLOOD PRESSURE: 84 MMHG | SYSTOLIC BLOOD PRESSURE: 134 MMHG | HEART RATE: 77 BPM | HEIGHT: 73 IN

## 2021-11-03 DIAGNOSIS — E66.9 OBESITY (BMI 30.0-34.9): ICD-10-CM

## 2021-11-03 DIAGNOSIS — R68.82 DECREASED LIBIDO: ICD-10-CM

## 2021-11-03 DIAGNOSIS — G47.19 EXCESSIVE DAYTIME SLEEPINESS: Primary | ICD-10-CM

## 2021-11-03 DIAGNOSIS — R53.83 FATIGUE, UNSPECIFIED TYPE: ICD-10-CM

## 2021-11-03 PROCEDURE — 3044F PR MOST RECENT HEMOGLOBIN A1C LEVEL <7.0%: ICD-10-PCS | Mod: CPTII,S$GLB,, | Performed by: FAMILY MEDICINE

## 2021-11-03 PROCEDURE — 3079F PR MOST RECENT DIASTOLIC BLOOD PRESSURE 80-89 MM HG: ICD-10-PCS | Mod: CPTII,S$GLB,, | Performed by: FAMILY MEDICINE

## 2021-11-03 PROCEDURE — 3008F PR BODY MASS INDEX (BMI) DOCUMENTED: ICD-10-PCS | Mod: CPTII,S$GLB,, | Performed by: FAMILY MEDICINE

## 2021-11-03 PROCEDURE — 3079F DIAST BP 80-89 MM HG: CPT | Mod: CPTII,S$GLB,, | Performed by: FAMILY MEDICINE

## 2021-11-03 PROCEDURE — 99214 OFFICE O/P EST MOD 30 MIN: CPT | Mod: S$GLB,,, | Performed by: FAMILY MEDICINE

## 2021-11-03 PROCEDURE — 99214 PR OFFICE/OUTPT VISIT, EST, LEVL IV, 30-39 MIN: ICD-10-PCS | Mod: S$GLB,,, | Performed by: FAMILY MEDICINE

## 2021-11-03 PROCEDURE — 3044F HG A1C LEVEL LT 7.0%: CPT | Mod: CPTII,S$GLB,, | Performed by: FAMILY MEDICINE

## 2021-11-03 PROCEDURE — 99999 PR PBB SHADOW E&M-EST. PATIENT-LVL III: ICD-10-PCS | Mod: PBBFAC,,, | Performed by: FAMILY MEDICINE

## 2021-11-03 PROCEDURE — 1159F PR MEDICATION LIST DOCUMENTED IN MEDICAL RECORD: ICD-10-PCS | Mod: CPTII,S$GLB,, | Performed by: FAMILY MEDICINE

## 2021-11-03 PROCEDURE — 3075F PR MOST RECENT SYSTOLIC BLOOD PRESS GE 130-139MM HG: ICD-10-PCS | Mod: CPTII,S$GLB,, | Performed by: FAMILY MEDICINE

## 2021-11-03 PROCEDURE — 99999 PR PBB SHADOW E&M-EST. PATIENT-LVL III: CPT | Mod: PBBFAC,,, | Performed by: FAMILY MEDICINE

## 2021-11-03 PROCEDURE — 1160F RVW MEDS BY RX/DR IN RCRD: CPT | Mod: CPTII,S$GLB,, | Performed by: FAMILY MEDICINE

## 2021-11-03 PROCEDURE — 3075F SYST BP GE 130 - 139MM HG: CPT | Mod: CPTII,S$GLB,, | Performed by: FAMILY MEDICINE

## 2021-11-03 PROCEDURE — 4010F ACE/ARB THERAPY RXD/TAKEN: CPT | Mod: CPTII,S$GLB,, | Performed by: FAMILY MEDICINE

## 2021-11-03 PROCEDURE — 1159F MED LIST DOCD IN RCRD: CPT | Mod: CPTII,S$GLB,, | Performed by: FAMILY MEDICINE

## 2021-11-03 PROCEDURE — 4010F PR ACE/ARB THEARPY RXD/TAKEN: ICD-10-PCS | Mod: CPTII,S$GLB,, | Performed by: FAMILY MEDICINE

## 2021-11-03 PROCEDURE — 1160F PR REVIEW ALL MEDS BY PRESCRIBER/CLIN PHARMACIST DOCUMENTED: ICD-10-PCS | Mod: CPTII,S$GLB,, | Performed by: FAMILY MEDICINE

## 2021-11-03 PROCEDURE — 3008F BODY MASS INDEX DOCD: CPT | Mod: CPTII,S$GLB,, | Performed by: FAMILY MEDICINE

## 2021-11-05 ENCOUNTER — LAB VISIT (OUTPATIENT)
Dept: LAB | Facility: HOSPITAL | Age: 31
End: 2021-11-05
Attending: FAMILY MEDICINE
Payer: COMMERCIAL

## 2021-11-05 DIAGNOSIS — R68.82 DECREASED LIBIDO: ICD-10-CM

## 2021-11-05 DIAGNOSIS — G47.19 EXCESSIVE DAYTIME SLEEPINESS: ICD-10-CM

## 2021-11-05 DIAGNOSIS — R53.83 FATIGUE, UNSPECIFIED TYPE: ICD-10-CM

## 2021-11-05 DIAGNOSIS — E66.9 OBESITY (BMI 30.0-34.9): ICD-10-CM

## 2021-11-05 LAB
ESTIMATED AVG GLUCOSE: 103 MG/DL (ref 68–131)
HBA1C MFR BLD: 5.2 % (ref 4–5.6)

## 2021-11-05 PROCEDURE — 84403 ASSAY OF TOTAL TESTOSTERONE: CPT | Performed by: FAMILY MEDICINE

## 2021-11-05 PROCEDURE — 83036 HEMOGLOBIN GLYCOSYLATED A1C: CPT | Performed by: FAMILY MEDICINE

## 2021-11-11 LAB
ALBUMIN SERPL-MCNC: 4.3 G/DL (ref 3.6–5.1)
SHBG SERPL-SCNC: 19 NMOL/L (ref 10–50)
TESTOST FREE SERPL-MCNC: 67.3 PG/ML (ref 46–224)
TESTOST SERPL-MCNC: 340 NG/DL (ref 250–1100)
TESTOSTERONE.FREE+WB SERPL-MCNC: 132.6 NG/DL (ref 110–575)

## 2021-12-16 ENCOUNTER — PATIENT OUTREACH (OUTPATIENT)
Dept: ADMINISTRATIVE | Facility: OTHER | Age: 31
End: 2021-12-16
Payer: COMMERCIAL

## 2021-12-17 ENCOUNTER — OFFICE VISIT (OUTPATIENT)
Dept: PODIATRY | Facility: CLINIC | Age: 31
End: 2021-12-17
Payer: COMMERCIAL

## 2021-12-17 VITALS — WEIGHT: 235.25 LBS | BODY MASS INDEX: 31.86 KG/M2 | HEIGHT: 72 IN

## 2021-12-17 DIAGNOSIS — M72.2 PLANTAR FASCIITIS: Primary | ICD-10-CM

## 2021-12-17 DIAGNOSIS — M62.461 GASTROCNEMIUS EQUINUS OF RIGHT LOWER EXTREMITY: ICD-10-CM

## 2021-12-17 DIAGNOSIS — M72.2 PLANTAR FASCIITIS, RIGHT: ICD-10-CM

## 2021-12-17 DIAGNOSIS — M62.462 GASTROCNEMIUS EQUINUS OF LEFT LOWER EXTREMITY: ICD-10-CM

## 2021-12-17 PROCEDURE — 4010F ACE/ARB THERAPY RXD/TAKEN: CPT | Mod: CPTII,S$GLB,, | Performed by: PODIATRIST

## 2021-12-17 PROCEDURE — 99999 PR PBB SHADOW E&M-EST. PATIENT-LVL III: CPT | Mod: PBBFAC,,, | Performed by: PODIATRIST

## 2021-12-17 PROCEDURE — 99214 PR OFFICE/OUTPT VISIT, EST, LEVL IV, 30-39 MIN: ICD-10-PCS | Mod: S$GLB,,, | Performed by: PODIATRIST

## 2021-12-17 PROCEDURE — 99999 PR PBB SHADOW E&M-EST. PATIENT-LVL III: ICD-10-PCS | Mod: PBBFAC,,, | Performed by: PODIATRIST

## 2021-12-17 PROCEDURE — 4010F PR ACE/ARB THEARPY RXD/TAKEN: ICD-10-PCS | Mod: CPTII,S$GLB,, | Performed by: PODIATRIST

## 2021-12-17 PROCEDURE — 99214 OFFICE O/P EST MOD 30 MIN: CPT | Mod: S$GLB,,, | Performed by: PODIATRIST

## 2021-12-17 RX ORDER — METHYLPREDNISOLONE 4 MG/1
TABLET ORAL
Qty: 1 EACH | Refills: 0 | Status: SHIPPED | OUTPATIENT
Start: 2021-12-17 | End: 2022-02-08

## 2022-01-11 ENCOUNTER — PATIENT MESSAGE (OUTPATIENT)
Dept: INTERNAL MEDICINE | Facility: CLINIC | Age: 32
End: 2022-01-11
Payer: COMMERCIAL

## 2022-01-11 DIAGNOSIS — B35.6 TINEA CRURIS: ICD-10-CM

## 2022-01-12 RX ORDER — LEVOCETIRIZINE DIHYDROCHLORIDE 5 MG/1
5 TABLET, FILM COATED ORAL DAILY
COMMUNITY
Start: 2021-12-22 | End: 2022-02-08

## 2022-01-12 RX ORDER — CLOTRIMAZOLE AND BETAMETHASONE DIPROPIONATE 10; .64 MG/G; MG/G
CREAM TOPICAL 2 TIMES DAILY
Qty: 45 G | Refills: 0 | Status: SHIPPED | OUTPATIENT
Start: 2022-01-12 | End: 2022-07-21

## 2022-01-26 ENCOUNTER — PATIENT OUTREACH (OUTPATIENT)
Dept: ADMINISTRATIVE | Facility: OTHER | Age: 32
End: 2022-01-26
Payer: COMMERCIAL

## 2022-01-26 ENCOUNTER — PATIENT MESSAGE (OUTPATIENT)
Dept: PODIATRY | Facility: CLINIC | Age: 32
End: 2022-01-26
Payer: COMMERCIAL

## 2022-02-08 ENCOUNTER — LAB VISIT (OUTPATIENT)
Dept: LAB | Facility: HOSPITAL | Age: 32
End: 2022-02-08
Attending: FAMILY MEDICINE
Payer: COMMERCIAL

## 2022-02-08 ENCOUNTER — OFFICE VISIT (OUTPATIENT)
Dept: INTERNAL MEDICINE | Facility: CLINIC | Age: 32
End: 2022-02-08
Payer: COMMERCIAL

## 2022-02-08 VITALS
HEIGHT: 72 IN | DIASTOLIC BLOOD PRESSURE: 84 MMHG | SYSTOLIC BLOOD PRESSURE: 122 MMHG | TEMPERATURE: 98 F | BODY MASS INDEX: 32.37 KG/M2 | OXYGEN SATURATION: 98 % | WEIGHT: 239 LBS | HEART RATE: 93 BPM

## 2022-02-08 DIAGNOSIS — I10 ESSENTIAL HYPERTENSION: ICD-10-CM

## 2022-02-08 DIAGNOSIS — Z00.00 ANNUAL PHYSICAL EXAM: ICD-10-CM

## 2022-02-08 DIAGNOSIS — G43.009 MIGRAINE WITHOUT AURA AND WITHOUT STATUS MIGRAINOSUS, NOT INTRACTABLE: ICD-10-CM

## 2022-02-08 DIAGNOSIS — Z00.00 ANNUAL PHYSICAL EXAM: Primary | ICD-10-CM

## 2022-02-08 DIAGNOSIS — N20.0 KIDNEY STONES: ICD-10-CM

## 2022-02-08 DIAGNOSIS — J30.89 NON-SEASONAL ALLERGIC RHINITIS, UNSPECIFIED TRIGGER: ICD-10-CM

## 2022-02-08 LAB
ERYTHROCYTE [DISTWIDTH] IN BLOOD BY AUTOMATED COUNT: 12.2 % (ref 11.5–14.5)
HCT VFR BLD AUTO: 48.4 % (ref 40–54)
HGB BLD-MCNC: 15.6 G/DL (ref 14–18)
MCH RBC QN AUTO: 27.8 PG (ref 27–31)
MCHC RBC AUTO-ENTMCNC: 32.2 G/DL (ref 32–36)
MCV RBC AUTO: 86 FL (ref 82–98)
PLATELET # BLD AUTO: 279 K/UL (ref 150–450)
PMV BLD AUTO: 11.4 FL (ref 9.2–12.9)
RBC # BLD AUTO: 5.62 M/UL (ref 4.6–6.2)
WBC # BLD AUTO: 6.29 K/UL (ref 3.9–12.7)

## 2022-02-08 PROCEDURE — 99395 PREV VISIT EST AGE 18-39: CPT | Mod: S$GLB,,, | Performed by: FAMILY MEDICINE

## 2022-02-08 PROCEDURE — 1159F MED LIST DOCD IN RCRD: CPT | Mod: CPTII,S$GLB,, | Performed by: FAMILY MEDICINE

## 2022-02-08 PROCEDURE — 99999 PR PBB SHADOW E&M-EST. PATIENT-LVL IV: ICD-10-PCS | Mod: PBBFAC,,, | Performed by: FAMILY MEDICINE

## 2022-02-08 PROCEDURE — 3079F PR MOST RECENT DIASTOLIC BLOOD PRESSURE 80-89 MM HG: ICD-10-PCS | Mod: CPTII,S$GLB,, | Performed by: FAMILY MEDICINE

## 2022-02-08 PROCEDURE — 80061 LIPID PANEL: CPT | Performed by: FAMILY MEDICINE

## 2022-02-08 PROCEDURE — 84443 ASSAY THYROID STIM HORMONE: CPT | Performed by: FAMILY MEDICINE

## 2022-02-08 PROCEDURE — 1160F RVW MEDS BY RX/DR IN RCRD: CPT | Mod: CPTII,S$GLB,, | Performed by: FAMILY MEDICINE

## 2022-02-08 PROCEDURE — 3008F PR BODY MASS INDEX (BMI) DOCUMENTED: ICD-10-PCS | Mod: CPTII,S$GLB,, | Performed by: FAMILY MEDICINE

## 2022-02-08 PROCEDURE — 3074F PR MOST RECENT SYSTOLIC BLOOD PRESSURE < 130 MM HG: ICD-10-PCS | Mod: CPTII,S$GLB,, | Performed by: FAMILY MEDICINE

## 2022-02-08 PROCEDURE — 3079F DIAST BP 80-89 MM HG: CPT | Mod: CPTII,S$GLB,, | Performed by: FAMILY MEDICINE

## 2022-02-08 PROCEDURE — 36415 COLL VENOUS BLD VENIPUNCTURE: CPT | Performed by: FAMILY MEDICINE

## 2022-02-08 PROCEDURE — 1159F PR MEDICATION LIST DOCUMENTED IN MEDICAL RECORD: ICD-10-PCS | Mod: CPTII,S$GLB,, | Performed by: FAMILY MEDICINE

## 2022-02-08 PROCEDURE — 3008F BODY MASS INDEX DOCD: CPT | Mod: CPTII,S$GLB,, | Performed by: FAMILY MEDICINE

## 2022-02-08 PROCEDURE — 85027 COMPLETE CBC AUTOMATED: CPT | Performed by: FAMILY MEDICINE

## 2022-02-08 PROCEDURE — 99395 PR PREVENTIVE VISIT,EST,18-39: ICD-10-PCS | Mod: S$GLB,,, | Performed by: FAMILY MEDICINE

## 2022-02-08 PROCEDURE — 99999 PR PBB SHADOW E&M-EST. PATIENT-LVL IV: CPT | Mod: PBBFAC,,, | Performed by: FAMILY MEDICINE

## 2022-02-08 PROCEDURE — 80053 COMPREHEN METABOLIC PANEL: CPT | Performed by: FAMILY MEDICINE

## 2022-02-08 PROCEDURE — 3074F SYST BP LT 130 MM HG: CPT | Mod: CPTII,S$GLB,, | Performed by: FAMILY MEDICINE

## 2022-02-08 PROCEDURE — 1160F PR REVIEW ALL MEDS BY PRESCRIBER/CLIN PHARMACIST DOCUMENTED: ICD-10-PCS | Mod: CPTII,S$GLB,, | Performed by: FAMILY MEDICINE

## 2022-02-08 RX ORDER — AMLODIPINE AND OLMESARTAN MEDOXOMIL 5; 20 MG/1; MG/1
1 TABLET ORAL DAILY
Qty: 90 TABLET | Refills: 3 | Status: SHIPPED | OUTPATIENT
Start: 2022-02-08 | End: 2023-01-04 | Stop reason: SDUPTHER

## 2022-02-08 NOTE — PROGRESS NOTES
Subjective:   Patient ID: Vasile Norris is a 32 y.o. male.  Chief Complaint:  Annual Exam    Patient presents for annual physical exam     Last annual physical exam January 2021  Blood Counts are normal. No significant anemia.  Thyroid testing is normal.  Cholesterol tests are normal. 10-year risk of a heart disease or stroke is low. Aspirin daily is not recommended. A statin cholesterol medication is not recommended.  Sugar, Kidney, Liver, and Electrolyte tests are all normal.  HIV test negative  Hepatitis C test is negative.    Last visit November 2021 for excessive daytime drowsiness  Total, free, and bioavailable testosterone levels all normal.  A1c is in a normal range. No Prediabtes or Diabetes   [place] sleep questionnaire positive.  Home sleep study ordered.  No testing done to date due to deductible. Reports symptoms have resolved/improved.    Medical history for:   - Hypertension. Controlled Naya 10/40 mg daily.  Reports compliance.  Denies side effects.  No shortness of breath or swelling.    - Allergic rhino sinusitis.  Stable on Singulair 10 mg nightly  - Migraine headaches.  Sees neurology.  Receives Botox injections. Imitrex and Zofran p.r.n. Still effective.    - Nephrolithiasis.  Sees urology.  Urocit-K for prevention.      Family history for prostate cancer, but no colon cancer  Other than kidney stones, no active  symptoms   Tetanus booster up-to-date  No documented COVID vaccines  Needs flu vaccine    No specific complaints today         Current Outpatient Medications:     BOTOX 200 unit SolR, , Disp: , Rfl:     clotrimazole-betamethasone 1-0.05% (LOTRISONE) cream, Apply topically 2 (two) times daily., Disp: 45 g, Rfl: 0    montelukast (SINGULAIR) 10 mg tablet, Take 1 tablet (10 mg total) by mouth every evening., Disp: 90 tablet, Rfl: 3    ondansetron (ZOFRAN) 4 MG tablet, TAKE ONE BY MOUTH EVERY 8 HOURS AS NEEDED, FOR NAUSEA, Disp: , Rfl:     potassium citrate (UROCIT-K) 10 mEq (1,080  mg) TbSR, Take 1 tablet by mouth once daily., Disp: , Rfl:     amlodipine-olmesartan (ANUP) 5-20 mg per tablet, Take 1 tablet by mouth once daily., Disp: 90 tablet, Rfl: 3    Review of Systems   Constitutional: Negative for chills, fatigue and fever.   HENT: Negative for congestion, dental problem, ear discharge, ear pain, postnasal drip, rhinorrhea, sinus pressure, sinus pain, sore throat and trouble swallowing.    Eyes: Negative for pain, redness and visual disturbance.   Respiratory: Negative for cough, chest tightness, shortness of breath and wheezing.    Cardiovascular: Negative for chest pain, palpitations and leg swelling.   Gastrointestinal: Negative for abdominal pain, constipation, diarrhea, nausea and vomiting.   Endocrine: Negative for polydipsia, polyphagia and polyuria.   Genitourinary: Negative for difficulty urinating, dysuria, flank pain, frequency, hematuria and urgency.   Musculoskeletal: Negative for myalgias and neck pain.   Skin: Negative for rash.   Neurological: Negative for dizziness, syncope, weakness, light-headedness and headaches.   Hematological: Negative for adenopathy.   Psychiatric/Behavioral: Negative for sleep disturbance. The patient is not nervous/anxious.      Objective:   /84 (BP Location: Left arm, Patient Position: Sitting, BP Method: Large (Manual))   Pulse 93   Temp 97.8 °F (36.6 °C) (Tympanic)   Ht 6' (1.829 m)   Wt 108.4 kg (238 lb 15.7 oz)   SpO2 98%   BMI 32.41 kg/m²     Physical Exam  Vitals and nursing note reviewed.   Constitutional:       Appearance: Normal appearance. He is well-developed. He is obese.   HENT:      Right Ear: Hearing, tympanic membrane, ear canal and external ear normal.      Left Ear: Hearing, tympanic membrane, ear canal and external ear normal.      Nose: Nose normal. No mucosal edema, congestion or rhinorrhea.      Right Turbinates: Not enlarged or swollen.      Left Turbinates: Not enlarged or swollen.      Right Sinus: No  maxillary sinus tenderness or frontal sinus tenderness.      Left Sinus: No maxillary sinus tenderness or frontal sinus tenderness.      Mouth/Throat:      Mouth: No oral lesions.      Pharynx: Oropharynx is clear. Uvula midline.      Tonsils: No tonsillar exudate.   Eyes:      General: No scleral icterus.        Right eye: No discharge.         Left eye: No discharge.      Conjunctiva/sclera: Conjunctivae normal.      Right eye: Right conjunctiva is not injected.      Left eye: Left conjunctiva is not injected.   Neck:      Thyroid: No thyroid mass, thyromegaly or thyroid tenderness.      Vascular: No JVD.   Cardiovascular:      Rate and Rhythm: Normal rate and regular rhythm.      Pulses:           Radial pulses are 2+ on the right side and 2+ on the left side.      Heart sounds: Normal heart sounds. No murmur heard.  No friction rub. No gallop.    Pulmonary:      Effort: Pulmonary effort is normal. No accessory muscle usage or respiratory distress.      Breath sounds: Normal breath sounds. No wheezing, rhonchi or rales.   Abdominal:      General: There is no distension.      Palpations: Abdomen is soft.      Tenderness: There is no abdominal tenderness. There is no guarding or rebound.   Musculoskeletal:      Right lower leg: No edema.      Left lower leg: No edema.   Lymphadenopathy:      Cervical: No cervical adenopathy.   Skin:     General: Skin is warm and dry.      Findings: No rash.   Neurological:      Mental Status: He is alert and oriented to person, place, and time.      Coordination: Coordination is intact. Coordination normal.      Gait: Gait is intact. Gait normal.   Psychiatric:         Attention and Perception: Attention and perception normal.         Mood and Affect: Mood and affect normal.         Speech: Speech normal.         Behavior: Behavior normal.         Thought Content: Thought content normal.         Cognition and Memory: Cognition and memory normal.         Judgment: Judgment normal.        Assessment:       ICD-10-CM ICD-9-CM   1. Annual physical exam  Z00.00 V70.0   2. Essential hypertension  I10 401.9   3. Non-seasonal allergic rhinitis, unspecified trigger  J30.89 477.8   4. Migraine without aura and without status migrainosus, not intractable  G43.009 346.10   5. Kidney stones  N20.0 592.0     Plan:   Annual physical exam  -     CBC Without Differential; Future; Expected date: 02/08/2022  -     Comprehensive Metabolic Panel; Future; Expected date: 02/08/2022  -     Lipid Panel; Future; Expected date: 02/08/2022  -     TSH; Future; Expected date: 02/08/2022  Blood pressure normal.  BMI 32.4.  Remainder exam stable.  Check labs.  Treat as indicated.  Colon cancer screening at 45 years old  Prostate cancer screening at 40 years old  Tetanus booster up-to-date  Declines COVID vaccine series  Declines flu vaccine    Essential hypertension  -     amlodipine-olmesartan (ANUP) 5-20 mg per tablet; Take 1 tablet by mouth once daily.  Dispense: 90 tablet; Refill: 3  Controlled.  Stable.  Asymptomatic.  BP at goal.  Continue Anup 5/20 mg daily    Non-seasonal allergic rhinitis, unspecified trigger  Stable.  Symptoms controlled.  Continue Singulair 10 mg daily    Migraine without aura and without status migrainosus, not intractable  Stable headache pattern  Continue per Neurology    Kidney stones  No recent recurrence of stones  Continue Urocit-K  Follow-up urology as planned    Return to clinic 1 year for annual physical exam or sooner if needed

## 2022-02-09 LAB
ALBUMIN SERPL BCP-MCNC: 4.4 G/DL (ref 3.5–5.2)
ALP SERPL-CCNC: 79 U/L (ref 55–135)
ALT SERPL W/O P-5'-P-CCNC: 23 U/L (ref 10–44)
ANION GAP SERPL CALC-SCNC: 10 MMOL/L (ref 8–16)
AST SERPL-CCNC: 22 U/L (ref 10–40)
BILIRUB SERPL-MCNC: 0.4 MG/DL (ref 0.1–1)
BUN SERPL-MCNC: 14 MG/DL (ref 6–20)
CALCIUM SERPL-MCNC: 9.7 MG/DL (ref 8.7–10.5)
CHLORIDE SERPL-SCNC: 106 MMOL/L (ref 95–110)
CHOLEST SERPL-MCNC: 168 MG/DL (ref 120–199)
CHOLEST/HDLC SERPL: 4.8 {RATIO} (ref 2–5)
CO2 SERPL-SCNC: 25 MMOL/L (ref 23–29)
CREAT SERPL-MCNC: 0.9 MG/DL (ref 0.5–1.4)
EST. GFR  (AFRICAN AMERICAN): >60 ML/MIN/1.73 M^2
EST. GFR  (NON AFRICAN AMERICAN): >60 ML/MIN/1.73 M^2
GLUCOSE SERPL-MCNC: 79 MG/DL (ref 70–110)
HDLC SERPL-MCNC: 35 MG/DL (ref 40–75)
HDLC SERPL: 20.8 % (ref 20–50)
LDLC SERPL CALC-MCNC: 105 MG/DL (ref 63–159)
NONHDLC SERPL-MCNC: 133 MG/DL
POTASSIUM SERPL-SCNC: 3.9 MMOL/L (ref 3.5–5.1)
PROT SERPL-MCNC: 7.3 G/DL (ref 6–8.4)
SODIUM SERPL-SCNC: 141 MMOL/L (ref 136–145)
TRIGL SERPL-MCNC: 140 MG/DL (ref 30–150)
TSH SERPL DL<=0.005 MIU/L-ACNC: 1.09 UIU/ML (ref 0.4–4)

## 2022-05-12 ENCOUNTER — PATIENT MESSAGE (OUTPATIENT)
Dept: INTERNAL MEDICINE | Facility: CLINIC | Age: 32
End: 2022-05-12
Payer: COMMERCIAL

## 2022-05-12 DIAGNOSIS — Z86.19 HISTORY OF TRAVELER'S DIARRHEA: Primary | ICD-10-CM

## 2022-05-16 RX ORDER — CIPROFLOXACIN 500 MG/1
500 TABLET ORAL 2 TIMES DAILY
Qty: 6 TABLET | Refills: 0 | Status: SHIPPED | OUTPATIENT
Start: 2022-05-16 | End: 2022-05-19

## 2022-06-08 ENCOUNTER — OFFICE VISIT (OUTPATIENT)
Dept: DERMATOLOGY | Facility: CLINIC | Age: 32
End: 2022-06-08
Payer: COMMERCIAL

## 2022-06-08 DIAGNOSIS — L82.1 SEBORRHEIC KERATOSES: Primary | ICD-10-CM

## 2022-06-08 PROCEDURE — 99999 PR PBB SHADOW E&M-EST. PATIENT-LVL III: CPT | Mod: PBBFAC,,, | Performed by: STUDENT IN AN ORGANIZED HEALTH CARE EDUCATION/TRAINING PROGRAM

## 2022-06-08 PROCEDURE — 4010F ACE/ARB THERAPY RXD/TAKEN: CPT | Mod: CPTII,S$GLB,, | Performed by: STUDENT IN AN ORGANIZED HEALTH CARE EDUCATION/TRAINING PROGRAM

## 2022-06-08 PROCEDURE — 99212 PR OFFICE/OUTPT VISIT, EST, LEVL II, 10-19 MIN: ICD-10-PCS | Mod: S$GLB,,, | Performed by: STUDENT IN AN ORGANIZED HEALTH CARE EDUCATION/TRAINING PROGRAM

## 2022-06-08 PROCEDURE — 1160F RVW MEDS BY RX/DR IN RCRD: CPT | Mod: CPTII,S$GLB,, | Performed by: STUDENT IN AN ORGANIZED HEALTH CARE EDUCATION/TRAINING PROGRAM

## 2022-06-08 PROCEDURE — 1160F PR REVIEW ALL MEDS BY PRESCRIBER/CLIN PHARMACIST DOCUMENTED: ICD-10-PCS | Mod: CPTII,S$GLB,, | Performed by: STUDENT IN AN ORGANIZED HEALTH CARE EDUCATION/TRAINING PROGRAM

## 2022-06-08 PROCEDURE — 99999 PR PBB SHADOW E&M-EST. PATIENT-LVL III: ICD-10-PCS | Mod: PBBFAC,,, | Performed by: STUDENT IN AN ORGANIZED HEALTH CARE EDUCATION/TRAINING PROGRAM

## 2022-06-08 PROCEDURE — 1159F MED LIST DOCD IN RCRD: CPT | Mod: CPTII,S$GLB,, | Performed by: STUDENT IN AN ORGANIZED HEALTH CARE EDUCATION/TRAINING PROGRAM

## 2022-06-08 PROCEDURE — 99212 OFFICE O/P EST SF 10 MIN: CPT | Mod: S$GLB,,, | Performed by: STUDENT IN AN ORGANIZED HEALTH CARE EDUCATION/TRAINING PROGRAM

## 2022-06-08 PROCEDURE — 1159F PR MEDICATION LIST DOCUMENTED IN MEDICAL RECORD: ICD-10-PCS | Mod: CPTII,S$GLB,, | Performed by: STUDENT IN AN ORGANIZED HEALTH CARE EDUCATION/TRAINING PROGRAM

## 2022-06-08 PROCEDURE — 4010F PR ACE/ARB THEARPY RXD/TAKEN: ICD-10-PCS | Mod: CPTII,S$GLB,, | Performed by: STUDENT IN AN ORGANIZED HEALTH CARE EDUCATION/TRAINING PROGRAM

## 2022-06-08 NOTE — PROGRESS NOTES
Patient Information  Name: Vasile Norris  : 1990  MRN: [id number]     Referring Physician:  Dr. Phelps ref. provider found   Primary Care Physician:  Dr. Edinson Dominguez MD   Date of Visit: 2022      Subjective:       Vasile Norris is a 32 y.o. male who presents for   Chief Complaint   Patient presents with    Mole     Location on back; duration years; itchy     Presents for removal.      HPI   History of Present Illness: The patient presents with chief complaint of mole.  Location: back  Duration: years  Signs/Symptoms: itchy   Prior treatments: requesting removal     Patient was last seen:Visit date not found     Prior notes by myself reviewed.   Clinical documentation obtained by nursing staff reviewed.    Review of Systems   Skin: Negative for itching and rash.        Objective:    Physical Exam   Constitutional: He appears well-developed and well-nourished. No distress.   Neurological: He is alert and oriented to person, place, and time. He is not disoriented.   Psychiatric: He has a normal mood and affect.   Skin:   Areas Examined (abnormalities noted in diagram):   Back Inspection Performed              Diagram Legend     Erythematous scaling macule/papule c/w actinic keratosis       Vascular papule c/w angioma      Pigmented verrucoid papule/plaque c/w seborrheic keratosis      Yellow umbilicated papule c/w sebaceous hyperplasia      Irregularly shaped tan macule c/w lentigo     1-2 mm smooth white papules consistent with Milia      Movable subcutaneous cyst with punctum c/w epidermal inclusion cyst      Subcutaneous movable cyst c/w pilar cyst      Firm pink to brown papule c/w dermatofibroma      Pedunculated fleshy papule(s) c/w skin tag(s)      Evenly pigmented macule c/w junctional nevus     Mildly variegated pigmented, slightly irregular-bordered macule c/w mildly atypical nevus      Flesh colored to evenly pigmented papule c/w intradermal nevus       Pink pearly papule/plaque c/w  basal cell carcinoma      Erythematous hyperkeratotic cursted plaque c/w SCC      Surgical scar with no sign of skin cancer recurrence      Open and closed comedones      Inflammatory papules and pustules      Verrucoid papule consistent consistent with wart     Erythematous eczematous patches and plaques     Dystrophic onycholytic nail with subungual debris c/w onychomycosis     Umbilicated papule    Erythematous-base heme-crusted tan verrucoid plaque consistent with inflamed seborrheic keratosis     Erythematous Silvery Scaling Plaque c/w Psoriasis     See annotation      No images are attached to the encounter or orders placed in the encounter.    [] Data reviewed  [] Independent review of test  [] Management discussed with another provider    Assessment / Plan:        Seborrheic keratoses  These are benign inherited growths without a malignant potential. Reassurance given to patient. No treatment is necessary.          LOS NUMBER AND COMPLEXITY OF PROBLEMS    COMPLEXITY OF DATA RISK TOTAL TIME (m)   36059  75606 [] 1 self-limited or minor problem [x] Minimal to none [x] No treatment recommended or patient to monitor 15-29  10-19   60271  03292 Low  [] 2 or > self limited or minor problems  [x] 1 stable chronic illness  [] 1 acute, uncomplicated illness or injury Limited (2)  [] Prior external notes from each unique source  [] Review result of each unique test  [] Order each unique test []  Low  OTC medications, minor skin biopsy 30-44 20-29   96769  09784 Moderate  []  1 or > chronic illness with progression, exacerbation or SE of treatment  []  2 or more stable chronic illnesses  []  1 acute illness with systemic symptoms  []  1 acute complicated injury  []  1 undiagnosed new problem with uncertain prognosis Moderate (1/3 below)  []  3 or more data items        *Now includes assessment requiring independent historian  []  Independent interpretation of a test  []  Discuss management/test with another provider  Moderate  []  Prescription drug mgmt  []  Minor surgery with risk discussed  []  Mgmt limited by social determinates 45-59  30-39   81706  83522 High  []  1 or more chronic illness with severe exacerbation, progression or SE of treatment  []  1 acute or chronic illness/injury that poses a threat to life or bodily function Extensive (2/3 below)  []  3 or more data items        *Now includes assessment requiring independent historian.  []  Independent interpretation of a test  []  Discuss management/test with another provider High  []  Major surgery with risk discussed  []  Drug therapy requiring intensive monitoring for toxicity  []  Hospitalization  []  Decision for DNR 60-74  40-54      No follow-ups on file.    Holli Ag MD, FAAD  [facility]

## 2022-06-20 ENCOUNTER — PATIENT MESSAGE (OUTPATIENT)
Dept: INTERNAL MEDICINE | Facility: CLINIC | Age: 32
End: 2022-06-20
Payer: COMMERCIAL

## 2022-06-20 DIAGNOSIS — Z31.41 FERTILITY TESTING: Primary | ICD-10-CM

## 2022-07-07 ENCOUNTER — PATIENT MESSAGE (OUTPATIENT)
Dept: INTERNAL MEDICINE | Facility: CLINIC | Age: 32
End: 2022-07-07
Payer: COMMERCIAL

## 2022-07-20 ENCOUNTER — PATIENT MESSAGE (OUTPATIENT)
Dept: INTERNAL MEDICINE | Facility: CLINIC | Age: 32
End: 2022-07-20
Payer: COMMERCIAL

## 2022-07-21 DIAGNOSIS — Z31.41 FERTILITY TESTING: Primary | ICD-10-CM

## 2022-07-21 NOTE — TELEPHONE ENCOUNTER
Order printed out for facing to [facility] at [facility]    Please let patient know once done

## 2022-08-24 ENCOUNTER — PATIENT MESSAGE (OUTPATIENT)
Dept: INTERNAL MEDICINE | Facility: CLINIC | Age: 32
End: 2022-08-24
Payer: COMMERCIAL

## 2022-08-24 DIAGNOSIS — L29.0 PRURITUS ANI: Primary | ICD-10-CM

## 2022-08-24 RX ORDER — PRAMOXINE HYDROCHLORIDE 10 MG/G
AEROSOL, FOAM TOPICAL 3 TIMES DAILY PRN
Qty: 15 G | Refills: 1 | Status: SHIPPED | OUTPATIENT
Start: 2022-08-24 | End: 2022-08-29 | Stop reason: SDUPTHER

## 2022-08-29 ENCOUNTER — PATIENT MESSAGE (OUTPATIENT)
Dept: INTERNAL MEDICINE | Facility: CLINIC | Age: 32
End: 2022-08-29
Payer: COMMERCIAL

## 2022-08-29 DIAGNOSIS — L29.0 PRURITUS ANI: ICD-10-CM

## 2022-08-29 RX ORDER — PRAMOXINE HYDROCHLORIDE 10 MG/G
AEROSOL, FOAM TOPICAL 3 TIMES DAILY PRN
Qty: 15 G | Refills: 1 | Status: SHIPPED | OUTPATIENT
Start: 2022-08-29 | End: 2023-11-26 | Stop reason: SDUPTHER

## 2022-09-15 ENCOUNTER — PATIENT MESSAGE (OUTPATIENT)
Dept: INTERNAL MEDICINE | Facility: CLINIC | Age: 32
End: 2022-09-15
Payer: COMMERCIAL

## 2022-09-23 ENCOUNTER — PATIENT MESSAGE (OUTPATIENT)
Dept: INTERNAL MEDICINE | Facility: CLINIC | Age: 32
End: 2022-09-23
Payer: COMMERCIAL

## 2022-10-05 DIAGNOSIS — M25.511 RIGHT SHOULDER PAIN, UNSPECIFIED CHRONICITY: Primary | ICD-10-CM

## 2022-10-07 ENCOUNTER — PATIENT MESSAGE (OUTPATIENT)
Dept: ORTHOPEDICS | Facility: CLINIC | Age: 32
End: 2022-10-07

## 2022-10-07 ENCOUNTER — OFFICE VISIT (OUTPATIENT)
Dept: ORTHOPEDICS | Facility: CLINIC | Age: 32
End: 2022-10-07
Payer: COMMERCIAL

## 2022-10-07 ENCOUNTER — HOSPITAL ENCOUNTER (OUTPATIENT)
Dept: RADIOLOGY | Facility: HOSPITAL | Age: 32
Discharge: HOME | End: 2022-10-07
Attending: STUDENT IN AN ORGANIZED HEALTH CARE EDUCATION/TRAINING PROGRAM
Payer: COMMERCIAL

## 2022-10-07 VITALS — BODY MASS INDEX: 32.23 KG/M2 | WEIGHT: 238 LBS | HEIGHT: 72 IN

## 2022-10-07 DIAGNOSIS — M75.21 BICEPS TENDINITIS OF RIGHT UPPER EXTREMITY: Primary | ICD-10-CM

## 2022-10-07 DIAGNOSIS — M75.31 CALCIFIC TENDINITIS OF RIGHT SHOULDER REGION: ICD-10-CM

## 2022-10-07 DIAGNOSIS — M25.511 RIGHT SHOULDER PAIN, UNSPECIFIED CHRONICITY: ICD-10-CM

## 2022-10-07 PROCEDURE — 1160F RVW MEDS BY RX/DR IN RCRD: CPT | Mod: CPTII,S$GLB,, | Performed by: STUDENT IN AN ORGANIZED HEALTH CARE EDUCATION/TRAINING PROGRAM

## 2022-10-07 PROCEDURE — 99999 PR PBB SHADOW E&M-EST. PATIENT-LVL III: CPT | Mod: PBBFAC,,, | Performed by: STUDENT IN AN ORGANIZED HEALTH CARE EDUCATION/TRAINING PROGRAM

## 2022-10-07 PROCEDURE — 99999 PR PBB SHADOW E&M-EST. PATIENT-LVL III: ICD-10-PCS | Mod: PBBFAC,,, | Performed by: STUDENT IN AN ORGANIZED HEALTH CARE EDUCATION/TRAINING PROGRAM

## 2022-10-07 PROCEDURE — 1159F MED LIST DOCD IN RCRD: CPT | Mod: CPTII,S$GLB,, | Performed by: STUDENT IN AN ORGANIZED HEALTH CARE EDUCATION/TRAINING PROGRAM

## 2022-10-07 PROCEDURE — 73030 X-RAY EXAM OF SHOULDER: CPT | Mod: 26,RT,, | Performed by: STUDENT IN AN ORGANIZED HEALTH CARE EDUCATION/TRAINING PROGRAM

## 2022-10-07 PROCEDURE — 1159F PR MEDICATION LIST DOCUMENTED IN MEDICAL RECORD: ICD-10-PCS | Mod: CPTII,S$GLB,, | Performed by: STUDENT IN AN ORGANIZED HEALTH CARE EDUCATION/TRAINING PROGRAM

## 2022-10-07 PROCEDURE — 3008F PR BODY MASS INDEX (BMI) DOCUMENTED: ICD-10-PCS | Mod: CPTII,S$GLB,, | Performed by: STUDENT IN AN ORGANIZED HEALTH CARE EDUCATION/TRAINING PROGRAM

## 2022-10-07 PROCEDURE — 4010F PR ACE/ARB THEARPY RXD/TAKEN: ICD-10-PCS | Mod: CPTII,S$GLB,, | Performed by: STUDENT IN AN ORGANIZED HEALTH CARE EDUCATION/TRAINING PROGRAM

## 2022-10-07 PROCEDURE — 4010F ACE/ARB THERAPY RXD/TAKEN: CPT | Mod: CPTII,S$GLB,, | Performed by: STUDENT IN AN ORGANIZED HEALTH CARE EDUCATION/TRAINING PROGRAM

## 2022-10-07 PROCEDURE — 20610 DRAIN/INJ JOINT/BURSA W/O US: CPT | Mod: RT,S$GLB,, | Performed by: STUDENT IN AN ORGANIZED HEALTH CARE EDUCATION/TRAINING PROGRAM

## 2022-10-07 PROCEDURE — 99203 PR OFFICE/OUTPT VISIT, NEW, LEVL III, 30-44 MIN: ICD-10-PCS | Mod: 25,S$GLB,, | Performed by: STUDENT IN AN ORGANIZED HEALTH CARE EDUCATION/TRAINING PROGRAM

## 2022-10-07 PROCEDURE — 99203 OFFICE O/P NEW LOW 30 MIN: CPT | Mod: 25,S$GLB,, | Performed by: STUDENT IN AN ORGANIZED HEALTH CARE EDUCATION/TRAINING PROGRAM

## 2022-10-07 PROCEDURE — 73030 X-RAY EXAM OF SHOULDER: CPT | Mod: TC,RT

## 2022-10-07 PROCEDURE — 1160F PR REVIEW ALL MEDS BY PRESCRIBER/CLIN PHARMACIST DOCUMENTED: ICD-10-PCS | Mod: CPTII,S$GLB,, | Performed by: STUDENT IN AN ORGANIZED HEALTH CARE EDUCATION/TRAINING PROGRAM

## 2022-10-07 PROCEDURE — 3008F BODY MASS INDEX DOCD: CPT | Mod: CPTII,S$GLB,, | Performed by: STUDENT IN AN ORGANIZED HEALTH CARE EDUCATION/TRAINING PROGRAM

## 2022-10-07 PROCEDURE — 73030 XR SHOULDER COMPLETE 2 OR MORE VIEWS RIGHT: ICD-10-PCS | Mod: 26,RT,, | Performed by: STUDENT IN AN ORGANIZED HEALTH CARE EDUCATION/TRAINING PROGRAM

## 2022-10-07 PROCEDURE — 20610 LARGE JOINT ASPIRATION/INJECTION: R SUBACROMIAL BURSA: ICD-10-PCS | Mod: RT,S$GLB,, | Performed by: STUDENT IN AN ORGANIZED HEALTH CARE EDUCATION/TRAINING PROGRAM

## 2022-10-07 RX ORDER — TRIAMCINOLONE ACETONIDE 40 MG/ML
40 INJECTION, SUSPENSION INTRA-ARTICULAR; INTRAMUSCULAR
Status: DISCONTINUED | OUTPATIENT
Start: 2022-10-07 | End: 2022-10-07 | Stop reason: HOSPADM

## 2022-10-07 RX ADMIN — TRIAMCINOLONE ACETONIDE 40 MG: 40 INJECTION, SUSPENSION INTRA-ARTICULAR; INTRAMUSCULAR at 07:10

## 2022-10-07 NOTE — PROGRESS NOTES
Orthopaedics Sports Medicine     Shoulder Initial Visit         10/7/2022    Referring MD: Edinson Dominguez MD    Chief Complaint   Patient presents with    Right Shoulder - Pain         History of Present Illness:   Vasile Norris is a 32 y.o. right-hand dominant male who presents with RIGHT shoulder pain and dysfunction.    Onset of the symptoms was 3 years ago     Inciting event: He reports an injury to the shoulder while pushing himself up out of bed     Current symptoms include right shoulder pain, localized anteriorly and laterally with pain quality of intermittent burning that he rates a 3/10.      Pain is aggravated by movement, lifting, reaching    Evaluation to date: X-Ray,      Treatment to date: Rest, activity modification, oral anti-inflammatories      Past Medical History:   Past Medical History:   Diagnosis Date    Allergy     Hypertension     Kidney stones     Migraines        Past Surgical History:   Past Surgical History:   Procedure Laterality Date    kidney stone removal      LITHOTRIPSY Right     nail avulsion      PILONIDAL CYST DRAINAGE      WISDOM TOOTH EXTRACTION         Medications:  Patient's Medications   New Prescriptions    No medications on file   Previous Medications    AMLODIPINE-OLMESARTAN (ANUP) 5-20 MG PER TABLET    Take 1 tablet by mouth once daily.    BOTOX 200 UNIT SOLR        MONTELUKAST (SINGULAIR) 10 MG TABLET    Take 1 tablet (10 mg total) by mouth every evening.    ONDANSETRON (ZOFRAN) 4 MG TABLET    TAKE ONE BY MOUTH EVERY 8 HOURS AS NEEDED, FOR NAUSEA    POTASSIUM CITRATE (UROCIT-K) 10 MEQ (1,080 MG) TBSR    Take 1 tablet by mouth once daily.    PRAMOXINE 1 % FOAM    Place rectally 3 (three) times daily as needed.   Modified Medications    No medications on file   Discontinued Medications    No medications on file       Allergies:   Review of patient's allergies indicates:   Allergen Reactions    Codeine Hives    Nortriptyline Palpitations    Topiramate Other (See  Comments)       Social History:   Home town: [place], [place]  Occupation: Teacher  Alcohol use: He reports current alcohol use.  Tobacco use: He reports that he has never smoked. He has never used smokeless tobacco.    Review of systems:  History of recent illness, fevers, shakes, or chills: no  History of cardiac problems or chest pain: no  History of pulmonary problems or asthma: no  History of diabetes: no  History of prior dvt or clotting problems: no  History of sleep apnea: no      Physical Examination:  Estimated body mass index is 32.28 kg/m² as calculated from the following:    Height as of this encounter: 6' (1.829 m).    Weight as of this encounter: 108 kg (238 lb).    General  Healthy appearing male in no acute distress  Alert and oriented, normal mood, appropriate affect    Shoulder Examination:  Patient is alert and oriented, no distress. Skin is intact. Neuro is normal with no focal motor or sensory findings.    Cervical exam is unremarkable. Intact cervical ROM. Negative Spurling's test    Physical Exam:  RIGHT    LEFT    Scap Dyskinesis/Winging (-)    (-)    Tenderness:          Greater Tuberosity             (-)    (-)  Bicipital Groove  +    (-)  AC joint   (-)    (-)  Other:     ROM:  Forward Elevation 180    180  Abduction  120    120  ER (at side)  80    80  IR   T8    T8    Strength:   Supraspinatus  5/5    5/5  Infraspinatus  5/5    5/5  Subscap / IR  5/5    5/5     Special Tests:   Neer:    (-)    (-)   Roque:   +    (-)   SS Stress:   (-)    (-)   Bear Hug:   (-)    (-)   Lamoille's:   +    (-)   Resisted Thrower's:   +    (-)   Cross Arm Abduction:  +    (-)    Neurovascular examination  - Motor grossly intact bilaterally to shoulder abduction, elbow flexion and extension, wrist flexion and extension, and intrinsic hand musculature  - Sensation intact to light touch bilaterally in axillary, median, radial, and ulnar distributions  - Symmetrical radial pulses      Imaging:  XR  Results:  Results for orders placed during the hospital encounter of 10/07/22    X-ray Shoulder 2 or More Views Right    Narrative  EXAMINATION:  Four radiographic views of the SHOULDER.    CLINICAL HISTORY:  Pain in right shoulder    TECHNIQUE:  Four radiographic views of the SHOULDER    COMPARISON:  None.    FINDINGS:  Four views of the right shoulder demonstrate normal alignment.  There is mild osteoarthritis of the acromioclavicular joint.  There is a faint calcification adjacent to the greater tuberosity.  There is no fracture.  There is no soft tissue swelling.  The included right lung is clear.    Impression  Mild acromioclavicular osteoarthritis with findings suggesting calcific tendinitis of the rotator cuff.      Electronically signed by: Olivier Hinojosa MD  Date:    10/07/2022  Time:    07:32    MRI Results:  No results found for this or any previous visit.    CT Results:  No results found for this or any previous visit.      Physician Read: I agree with the above impression.      Impression:  32 y.o. male with right shoulder pain, calcific tendonitis, and biceps tendonitis.       Plan:  Discussed diagnosis and treatment options with patient today. His history, physical exam, and imaging are consistent with right shoulder biceps tendonitis and rotator cuff calcific tendonitis.   Discussed non-operative treatment measures in the form of rest, activity modifications, oral anti-inflammatories, corticosteroid injections, and physical therapy. Also discussed potential procedural treatment options in the form of minimally invasive tendon debridement or arthroscopic debridement and rotator cuff repair if symptoms persist.   He has tried rest, activity modifications, and oral anti-inflammatories but continues to experience symptoms on a daily basis.   I recommend proceeding with right shoulder corticosteroid injection into the subacromial space. The patient is in agreement with this plan.   Right shoulder  corticosteroid injection into the subacromial space performed today. Patient tolerated the procedure well with no immediate complications.   We will task him with a follow up in 6-8 weeks.            Eddie Sheridan MD    I, Alexandro House, acted as a scribe for Eddie Sheridan MD for the duration of this office visit.

## 2022-10-07 NOTE — PROCEDURES
Large Joint Aspiration/Injection: R subacromial bursa    Date/Time: 10/7/2022 7:45 AM  Performed by: Eddie Sheridan MD  Authorized by: Eddie Sheridan MD     Consent Done?:  Yes (Verbal)  Indications:  Pain  Site marked: the procedure site was marked    Timeout: prior to procedure the correct patient, procedure, and site was verified    Prep: patient was prepped and draped in usual sterile fashion      Local anesthesia used?: Yes    Anesthesia:  Local infiltration  Local anesthetic:  Lidocaine 1% without epinephrine    Details:  Needle Size:  22 G  Ultrasonic Guidance for needle placement?: No    Approach:  Posterior  Location:  Shoulder  Site:  R subacromial bursa  Medications:  40 mg triamcinolone acetonide 40 mg/mL  Patient tolerance:  Patient tolerated the procedure well with no immediate complications

## 2022-10-17 ENCOUNTER — LAB VISIT (OUTPATIENT)
Dept: LAB | Facility: HOSPITAL | Age: 32
End: 2022-10-17
Attending: UROLOGY
Payer: COMMERCIAL

## 2022-10-17 ENCOUNTER — OFFICE VISIT (OUTPATIENT)
Dept: UROLOGY | Facility: CLINIC | Age: 32
End: 2022-10-17
Payer: COMMERCIAL

## 2022-10-17 VITALS
HEIGHT: 72 IN | SYSTOLIC BLOOD PRESSURE: 121 MMHG | BODY MASS INDEX: 29.86 KG/M2 | HEART RATE: 76 BPM | DIASTOLIC BLOOD PRESSURE: 83 MMHG | WEIGHT: 220.44 LBS

## 2022-10-17 DIAGNOSIS — E29.1 TESTICULAR FAILURE: Primary | ICD-10-CM

## 2022-10-17 DIAGNOSIS — E29.1 TESTICULAR FAILURE: ICD-10-CM

## 2022-10-17 LAB
ESTRADIOL SERPL-MCNC: <10 PG/ML (ref 11–44)
FSH SERPL-ACNC: 11.06 MIU/ML (ref 0.95–11.95)
LH SERPL-ACNC: 3.6 MIU/ML (ref 0.6–12.1)
PROLACTIN SERPL IA-MCNC: 7.9 NG/ML (ref 3.5–19.4)
TESTOST SERPL-MCNC: 272 NG/DL (ref 304–1227)

## 2022-10-17 PROCEDURE — 4010F PR ACE/ARB THEARPY RXD/TAKEN: ICD-10-PCS | Mod: CPTII,S$GLB,, | Performed by: UROLOGY

## 2022-10-17 PROCEDURE — 99204 OFFICE O/P NEW MOD 45 MIN: CPT | Mod: S$GLB,,, | Performed by: UROLOGY

## 2022-10-17 PROCEDURE — 3079F DIAST BP 80-89 MM HG: CPT | Mod: CPTII,S$GLB,, | Performed by: UROLOGY

## 2022-10-17 PROCEDURE — 1159F PR MEDICATION LIST DOCUMENTED IN MEDICAL RECORD: ICD-10-PCS | Mod: CPTII,S$GLB,, | Performed by: UROLOGY

## 2022-10-17 PROCEDURE — 3074F SYST BP LT 130 MM HG: CPT | Mod: CPTII,S$GLB,, | Performed by: UROLOGY

## 2022-10-17 PROCEDURE — 4010F ACE/ARB THERAPY RXD/TAKEN: CPT | Mod: CPTII,S$GLB,, | Performed by: UROLOGY

## 2022-10-17 PROCEDURE — 99999 PR PBB SHADOW E&M-EST. PATIENT-LVL III: CPT | Mod: PBBFAC,,, | Performed by: UROLOGY

## 2022-10-17 PROCEDURE — 1159F MED LIST DOCD IN RCRD: CPT | Mod: CPTII,S$GLB,, | Performed by: UROLOGY

## 2022-10-17 PROCEDURE — 84403 ASSAY OF TOTAL TESTOSTERONE: CPT | Performed by: UROLOGY

## 2022-10-17 PROCEDURE — 3074F PR MOST RECENT SYSTOLIC BLOOD PRESSURE < 130 MM HG: ICD-10-PCS | Mod: CPTII,S$GLB,, | Performed by: UROLOGY

## 2022-10-17 PROCEDURE — 83001 ASSAY OF GONADOTROPIN (FSH): CPT | Performed by: UROLOGY

## 2022-10-17 PROCEDURE — 99999 PR PBB SHADOW E&M-EST. PATIENT-LVL III: ICD-10-PCS | Mod: PBBFAC,,, | Performed by: UROLOGY

## 2022-10-17 PROCEDURE — 3079F PR MOST RECENT DIASTOLIC BLOOD PRESSURE 80-89 MM HG: ICD-10-PCS | Mod: CPTII,S$GLB,, | Performed by: UROLOGY

## 2022-10-17 PROCEDURE — 84146 ASSAY OF PROLACTIN: CPT | Performed by: UROLOGY

## 2022-10-17 PROCEDURE — 99204 PR OFFICE/OUTPT VISIT, NEW, LEVL IV, 45-59 MIN: ICD-10-PCS | Mod: S$GLB,,, | Performed by: UROLOGY

## 2022-10-17 PROCEDURE — 83002 ASSAY OF GONADOTROPIN (LH): CPT | Performed by: UROLOGY

## 2022-10-17 PROCEDURE — 82670 ASSAY OF TOTAL ESTRADIOL: CPT | Performed by: UROLOGY

## 2022-10-17 PROCEDURE — 36415 COLL VENOUS BLD VENIPUNCTURE: CPT | Performed by: UROLOGY

## 2022-10-17 NOTE — LETTER
October 17, 2022        Kody Cervantes MD  [address]  [place] [address]             [facility] [address] [facility]  [address]  [place] [address]  Phone: [phone]   Patient: Vasile Norris   MR Number: [id number]   YOB: 1990   Date of Visit: 10/17/2022       Dear Dr. Cervantes:    Thank you for referring Vasile Norris to me for evaluation. Attached you will find relevant portions of my assessment and plan of care.    If you have questions, please do not hesitate to call me. I look forward to following Vasile Norris along with you.    Sincerely,      Norbert Sprague MD            CC  No Recipients    Enclosure

## 2022-10-17 NOTE — PROGRESS NOTES
"Chief Complaint:  Infertility    HPI:    Mr. Norris is a 32 y.o.  male who has been  to his wife for the past 6 years. They have been trying to achieve a pregnancy for the past 4 years but without success. 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 He denies a history of erectile dysfunction and ejaculatory problems.    He has achieved 2 pregnancies in the past that resulted in miscarriage.    SA 22 ([facility])--5.0 cc/9 million per cc/16%/4%  SA 22 ([facility])--4.5 cc/5.5 million per cc/36%/2%    Marisela Norris is 28 years old. ( [birth year]) Her menses are regular. She has undergone prior hysterosalpingogram. This was normal. She has achieved 2 prior pregnancies as above.  She sees Dr. Cervantes.    The couple has not undergone prior intrauterine insemination procedures.    The couple has not undergone prior in-vitro fertilization procedures.    Vasile Norris denies a history of exposure to harmful chemicals, toxins, and radiation.    No history of recent fevers greater than 101.5 degrees Farenheit.    No history of recent exposure to "wet heat."    No history of urological trauma or testicular torsion.    No history of prostatitis, epididymitis, and orchitis.    No history of post-pubertal mumps.    There is no known family history of fertility problems.    REVIEW OF SYSTEMS:     He denies headache, blurred vision, fever, nausea, vomiting, chills, abdominal pain, chest pain, sore throat, bleeding per rectum, cough, SOB, recent loss of consciousness, recent mental status changes, seizures, dizziness, or upper or lower extremity weakness.    PHYSICAL EXAM:     The patient generally appears in good health, is appropriately interactive, and is in no apparent distress.     Eyes: anicteric sclerae, moist conjunctivae; no lid-lag; PERRLA     HENT: Atraumatic; oropharynx clear with moist mucous membranes and no mucosal ulcerations;normal hard and " "soft palate.  No evidence of lymphadenopathy.    Neck: Trachea midline.  No thyromegaly.    Skin: No lesions.    Mental: Cooperative with normal affect.  Is oriented to time, place, and person.    Neuro: Grossly intact.    Chest: Normal inspiratory effort.   No accessory muscles.  No audible wheezes.  Respirations symmetric on inspiration and expiration.    Heart: Regular rhythm.      Abdomen:  Soft, non-tender. No masses or organomegaly. Bladder is not palpable. No evidence of flank discomfort. No evidence of inguinal hernia.    Genitourinary: Penis is normal with no evidence of plaques or induration. Urethral meatus is normal. Scrotum is normal. Testes are descended bilaterally with no evidence of abnormal masses or tenderness. Epididymis, vas deferens, and cord structures are normal bilaterally.  Testicular volume is approximately 18 cc on the R and 17 cc on the L.    Extremities: No cyanosis, clubbing, or edema.    IMPRESSION & PLAN:    Mr. Norris is a 32 y.o.  male who has been  to his wife for the past 6 years. They have been trying to achieve a pregnancy for the past 4 years but without success. 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 He denies a history of erectile dysfunction and ejaculatory problems.    He has achieved 2 pregnancies in the past that resulted in miscarriage.     6/30/22 ([facility])--5.0 cc/9 million per cc/16%/4%   8/1/22 ([facility])--4.5 cc/5.5 million per cc/36%/2%    1.  FSH, LH, testosterone, prolactin, and estradiol serum levels today.  2.  Independently interpreted his outside SA's today.   3.  Will phone review the above.  If labs are normal, would recommend IUI vs IVF from a male factor perspective.  4.  Recommend avoiding "wet heat."  5.  Recommend taking a multivitamin and 500 mg of vitamin c daily in addition to the multivitamin.  6.  Please send a copy of the note to Dr. Cervantes.  Thank you for the " consultation.  7.  If labs are abnormal, will have him RTC to discuss.    CC: Helen

## 2022-10-18 ENCOUNTER — TELEPHONE (OUTPATIENT)
Dept: UROLOGY | Facility: CLINIC | Age: 32
End: 2022-10-18
Payer: COMMERCIAL

## 2022-10-18 DIAGNOSIS — E29.1 TESTICULAR FAILURE: Primary | ICD-10-CM

## 2022-10-18 NOTE — TELEPHONE ENCOUNTER
"  Call placed to patient per LUL. AMALIA Spivey. Name and date of birth verified.Pt. informed of the following:"Please notify T is low.  It should be repeated between 7-10 am"  Dr. DANIEL Sprague MD.Pt. lab appt. Scheduled and noted. Informed of appointment details noted in portal. Pt. Verbalized understanding.  "

## 2022-10-19 ENCOUNTER — LAB VISIT (OUTPATIENT)
Dept: LAB | Facility: HOSPITAL | Age: 32
End: 2022-10-19
Attending: UROLOGY
Payer: COMMERCIAL

## 2022-10-19 DIAGNOSIS — E29.1 TESTICULAR FAILURE: ICD-10-CM

## 2022-10-19 LAB — TESTOST SERPL-MCNC: 319 NG/DL (ref 304–1227)

## 2022-10-19 PROCEDURE — 84403 ASSAY OF TOTAL TESTOSTERONE: CPT | Performed by: UROLOGY

## 2022-10-19 PROCEDURE — 36415 COLL VENOUS BLD VENIPUNCTURE: CPT | Performed by: UROLOGY

## 2022-10-21 ENCOUNTER — PATIENT MESSAGE (OUTPATIENT)
Dept: UROLOGY | Facility: CLINIC | Age: 32
End: 2022-10-21
Payer: COMMERCIAL

## 2022-10-26 ENCOUNTER — PATIENT MESSAGE (OUTPATIENT)
Dept: INTERNAL MEDICINE | Facility: CLINIC | Age: 32
End: 2022-10-26
Payer: COMMERCIAL

## 2022-10-26 RX ORDER — DOXYCYCLINE 100 MG/1
CAPSULE ORAL
Qty: 30 CAPSULE | Refills: 0 | Status: SHIPPED | OUTPATIENT
Start: 2022-10-26 | End: 2023-01-04

## 2022-11-02 ENCOUNTER — PATIENT MESSAGE (OUTPATIENT)
Dept: UROLOGY | Facility: CLINIC | Age: 32
End: 2022-11-02
Payer: COMMERCIAL

## 2022-11-15 ENCOUNTER — PATIENT MESSAGE (OUTPATIENT)
Dept: ORTHOPEDICS | Facility: CLINIC | Age: 32
End: 2022-11-15
Payer: COMMERCIAL

## 2022-11-30 ENCOUNTER — OFFICE VISIT (OUTPATIENT)
Dept: ORTHOPEDICS | Facility: CLINIC | Age: 32
End: 2022-11-30
Payer: COMMERCIAL

## 2022-11-30 VITALS — HEIGHT: 72 IN | WEIGHT: 220.44 LBS | BODY MASS INDEX: 29.86 KG/M2

## 2022-11-30 DIAGNOSIS — S43.431A TEAR OF RIGHT GLENOID LABRUM, INITIAL ENCOUNTER: ICD-10-CM

## 2022-11-30 DIAGNOSIS — M75.31 CALCIFIC TENDINITIS OF RIGHT SHOULDER REGION: Primary | ICD-10-CM

## 2022-11-30 DIAGNOSIS — M25.511 RIGHT SHOULDER PAIN, UNSPECIFIED CHRONICITY: ICD-10-CM

## 2022-11-30 PROCEDURE — 99214 OFFICE O/P EST MOD 30 MIN: CPT | Mod: S$GLB,,, | Performed by: STUDENT IN AN ORGANIZED HEALTH CARE EDUCATION/TRAINING PROGRAM

## 2022-11-30 PROCEDURE — 4010F PR ACE/ARB THEARPY RXD/TAKEN: ICD-10-PCS | Mod: CPTII,S$GLB,, | Performed by: STUDENT IN AN ORGANIZED HEALTH CARE EDUCATION/TRAINING PROGRAM

## 2022-11-30 PROCEDURE — 1159F MED LIST DOCD IN RCRD: CPT | Mod: CPTII,S$GLB,, | Performed by: STUDENT IN AN ORGANIZED HEALTH CARE EDUCATION/TRAINING PROGRAM

## 2022-11-30 PROCEDURE — 1159F PR MEDICATION LIST DOCUMENTED IN MEDICAL RECORD: ICD-10-PCS | Mod: CPTII,S$GLB,, | Performed by: STUDENT IN AN ORGANIZED HEALTH CARE EDUCATION/TRAINING PROGRAM

## 2022-11-30 PROCEDURE — 99999 PR PBB SHADOW E&M-EST. PATIENT-LVL III: CPT | Mod: PBBFAC,,, | Performed by: STUDENT IN AN ORGANIZED HEALTH CARE EDUCATION/TRAINING PROGRAM

## 2022-11-30 PROCEDURE — 1160F PR REVIEW ALL MEDS BY PRESCRIBER/CLIN PHARMACIST DOCUMENTED: ICD-10-PCS | Mod: CPTII,S$GLB,, | Performed by: STUDENT IN AN ORGANIZED HEALTH CARE EDUCATION/TRAINING PROGRAM

## 2022-11-30 PROCEDURE — 1160F RVW MEDS BY RX/DR IN RCRD: CPT | Mod: CPTII,S$GLB,, | Performed by: STUDENT IN AN ORGANIZED HEALTH CARE EDUCATION/TRAINING PROGRAM

## 2022-11-30 PROCEDURE — 99999 PR PBB SHADOW E&M-EST. PATIENT-LVL III: ICD-10-PCS | Mod: PBBFAC,,, | Performed by: STUDENT IN AN ORGANIZED HEALTH CARE EDUCATION/TRAINING PROGRAM

## 2022-11-30 PROCEDURE — 3008F PR BODY MASS INDEX (BMI) DOCUMENTED: ICD-10-PCS | Mod: CPTII,S$GLB,, | Performed by: STUDENT IN AN ORGANIZED HEALTH CARE EDUCATION/TRAINING PROGRAM

## 2022-11-30 PROCEDURE — 4010F ACE/ARB THERAPY RXD/TAKEN: CPT | Mod: CPTII,S$GLB,, | Performed by: STUDENT IN AN ORGANIZED HEALTH CARE EDUCATION/TRAINING PROGRAM

## 2022-11-30 PROCEDURE — 99214 PR OFFICE/OUTPT VISIT, EST, LEVL IV, 30-39 MIN: ICD-10-PCS | Mod: S$GLB,,, | Performed by: STUDENT IN AN ORGANIZED HEALTH CARE EDUCATION/TRAINING PROGRAM

## 2022-11-30 PROCEDURE — 3008F BODY MASS INDEX DOCD: CPT | Mod: CPTII,S$GLB,, | Performed by: STUDENT IN AN ORGANIZED HEALTH CARE EDUCATION/TRAINING PROGRAM

## 2022-11-30 NOTE — PROGRESS NOTES
Orthopaedic Follow-Up Visit    Last Appointment: 10/7/22  Diagnosis: Right shoulder pain, calcific tendonitis, and biceps tendonitis.   Prior Procedure: ALVIN MCFARLANE CSI    Vasile Norris is a 32 y.o. male who is here for f/u evaluation of his right shoulder. The patient was last seen here by me on 10/7/22 at which point we decided to proceed with right shoulder subacromial space corticosteroid injection prior to considering further treatment options. The patient returns today reporting that the symptoms were better after the shot for around 3 weeks and then it wore off and is interested in discussing further treatment options.     To review his history, Vasile Norris is a 32 y.o. right-hand dominant male who presented with RIGHT shoulder pain and dysfunction that began approximately 3 years ago. He reports an injury to the shoulder while pushing himself up out of bed. His symptoms included right shoulder pain, localized anteriorly and laterally with pain quality of intermittent burning that he rates a 3/10.  His pain was aggravated by movement, lifting, and reaching. He had tried rest, activity modification, and oral anti-inflammatories with no relief of his symptoms. We elected to proceed with right shoulder subacromial space corticosteroid injection.     Patient's medications, allergies, past medical, surgical, social and family histories were reviewed and updated as appropriate.    Review of Systems   All systems reviewed were negative.  Specifically, the patient denies fever, chills, weight loss, chest pain, shortness of breath, or dyspnea on exertion.      Past Medical History:   Diagnosis Date    Allergy     Hypertension     Kidney stones     Migraines        Past Surgical History:   Procedure Laterality Date    kidney stone removal      LITHOTRIPSY Right     nail avulsion      PILONIDAL CYST DRAINAGE      WISDOM TOOTH EXTRACTION         Patient's Medications   New Prescriptions    No medications on file    Previous Medications    AMLODIPINE-OLMESARTAN (ANUP) 5-20 MG PER TABLET    Take 1 tablet by mouth once daily.    BOTOX 200 UNIT SOLR        DOXYCYCLINE (MONODOX) 100 MG CAPSULE    Take 1 capsule daily 2 days before travel, daily during travel, and for 14 days upon return    ONDANSETRON (ZOFRAN) 4 MG TABLET    TAKE ONE BY MOUTH EVERY 8 HOURS AS NEEDED, FOR NAUSEA    POTASSIUM CITRATE (UROCIT-K) 10 MEQ (1,080 MG) TBSR    Take 1 tablet by mouth once daily.    PRAMOXINE 1 % FOAM    Place rectally 3 (three) times daily as needed.   Modified Medications    No medications on file   Discontinued Medications    No medications on file       Family History   Problem Relation Age of Onset    Hypertension Father     Prostate cancer Father     Seizures Mother     Colon cancer Neg Hx        Review of patient's allergies indicates:   Allergen Reactions    Codeine Hives    Nortriptyline Palpitations    Topiramate Other (See Comments)         Objective:      Physical Exam  Patient is alert and oriented, no distress. Skin is intact. Neuro is normal with no focal motor or sensory findings.    Cervical exam is unremarkable. Intact cervical ROM. Negative Spurling's test    Physical Exam:  RIGHT    LEFT    Scap Dyskinesis/Winging (-)    (-)    Tenderness:          Greater Tuberosity  +    (-)  Bicipital Groove  (-)    (-)  AC joint   (-)    (-)  Other:     ROM:  Forward Elevation 180    180  Abduction  120    120  ER (at side)  80    80  IR   T8    T8    Strength:   Supraspinatus  5/5    5/5  Infraspinatus  5/5    5/5  Subscap / IR  5/5    5/5     Special Tests:   Apprehension:   (-)    (-)   Zahira Relocation:  (-)    (-)   Jerk / Posterior Load:  +    (-)   Neer:    (-)    (-)   Roque:   +    (-)   SS Stress:   +    (-)   Bear Hug:   (-)    (-)   [place]'s:   +    (-)   Resisted Thrower's:   (-)    (-)   Cross Arm Abduction:  +    (-)    Neurovascular examination  - Motor grossly intact bilaterally to shoulder abduction, elbow flexion  and extension, wrist flexion and extension, and intrinsic hand musculature  - Sensation intact to light touch bilaterally in axillary, median, radial, and ulnar distributions  - Symmetrical radial pulses    Imaging:    XR Results:  Results for orders placed during the hospital encounter of 10/07/22    X-ray Shoulder 2 or More Views Right    Narrative  EXAMINATION:  Four radiographic views of the SHOULDER.    CLINICAL HISTORY:  Pain in right shoulder    TECHNIQUE:  Four radiographic views of the SHOULDER    COMPARISON:  None.    FINDINGS:  Four views of the right shoulder demonstrate normal alignment.  There is mild osteoarthritis of the acromioclavicular joint.  There is a faint calcification adjacent to the greater tuberosity.  There is no fracture.  There is no soft tissue swelling.  The included right lung is clear.    Impression  Mild acromioclavicular osteoarthritis with findings suggesting calcific tendinitis of the rotator cuff.      Electronically signed by: Olivier Hinojosa MD  Date:    10/07/2022  Time:    07:32      MRI Results:  No results found for this or any previous visit.      CT Results:  No results found for this or any previous visit.      Physician read: I agree with the above impression.    Assessment/Plan:   Vasile Norris is a 32 y.o. male with right shoulder pain, calcific tendonitis, suspected labral pathology.     Plan:    Discussed diagnosis and treatment options with him today. He continues to have shoulder pain that is consistent with calcific tendinitis and posterior labral tear.  He has tried rest, activity modification, NSAIDs, acetaminophen, and corticosteroid injection with no relief of his symptoms.    I recommend MRI arthrogram of the right shoulder to evaluate for intra-articular pathology, specifically labral tear.  Follow up after MRI           Eddie Sheridan MD    I, Rose Childs, acted as a scribe for Eddie Sheridan MD for the duration of this office  visit.

## 2022-12-27 NOTE — PROGRESS NOTES
Orthopaedic Follow-Up Visit    Last Appointment: 11/30/22  Diagnosis: Right shoulder pain, calcific tendonitis, suspected labral pathology.   Prior Procedure: MR Arthrogram    Vasile Norris is a 32 y.o. male who is here for f/u evaluation of his right shoulder. The patient was last seen here by me on 11/30/22 at which point we decided to send him for an MR Arthrogram prior to considering further treatment options. The patient returns today to review his MRI results and discuss further treatment options.     To review his history, Vasile Norris is a 32 y.o. right-hand dominant male who presented with RIGHT shoulder pain and dysfunction that began approximately 3 years ago. He reports an injury to the shoulder while pushing himself up out of bed. His symptoms included right shoulder pain, localized anteriorly and laterally with pain quality of intermittent burning that he rates a 3/10.  His pain was aggravated by movement, lifting, and reaching. He had tried rest, activity modification, and oral anti-inflammatories with no relief of his symptoms. We proceeded with right shoulder subacromial space corticosteroid injection but this did not provide any long term relief either. We the proceeded with MR Arthrogram of the right shoulder to evaluate for any intra-articular pathology.    Patient's medications, allergies, past medical, surgical, social and family histories were reviewed and updated as appropriate.    Review of Systems   All systems reviewed were negative.  Specifically, the patient denies fever, chills, weight loss, chest pain, shortness of breath, or dyspnea on exertion.      Past Medical History:   Diagnosis Date    Allergy     Hypertension     Kidney stones     Migraines        Past Surgical History:   Procedure Laterality Date    kidney stone removal      LITHOTRIPSY Right     nail avulsion      PILONIDAL CYST DRAINAGE      WISDOM TOOTH EXTRACTION         Patient's Medications   New Prescriptions     No medications on file   Previous Medications    AMLODIPINE-OLMESARTAN (ANUP) 5-20 MG PER TABLET    Take 1 tablet by mouth once daily.    BOTOX 200 UNIT SOLR        DOXYCYCLINE (MONODOX) 100 MG CAPSULE    Take 1 capsule daily 2 days before travel, daily during travel, and for 14 days upon return    ONDANSETRON (ZOFRAN) 4 MG TABLET    TAKE ONE BY MOUTH EVERY 8 HOURS AS NEEDED, FOR NAUSEA    POTASSIUM CITRATE (UROCIT-K) 10 MEQ (1,080 MG) TBSR    Take 1 tablet by mouth once daily.    PRAMOXINE 1 % FOAM    Place rectally 3 (three) times daily as needed.   Modified Medications    No medications on file   Discontinued Medications    No medications on file       Family History   Problem Relation Age of Onset    Hypertension Father     Prostate cancer Father     Seizures Mother     Colon cancer Neg Hx        Review of patient's allergies indicates:   Allergen Reactions    Codeine Hives    Nortriptyline Palpitations    Topiramate Other (See Comments)         Objective:      Physical Exam  Patient is alert and oriented, no distress. Skin is intact. Neuro is normal with no focal motor or sensory findings.    Cervical exam is unremarkable. Intact cervical ROM. Negative Spurling's test    Physical Exam:                       RIGHT                                     LEFT     Scap Dyskinesis/Winging       (-)                                             (-)     Tenderness:                                                                              Greater Tuberosity                  +                                              (-)  Bicipital Groove                       (-)                                             (-)  AC joint                                   (-)                                             (-)  Other:      ROM:  Forward Elevation       180                                          180  Abduction                    120                                          120  ER (at side)                 80                                             80  IR                                 T8                                            T8     Strength:   Supraspinatus             5/5                                           5/5  Infraspinatus               5/5 w/ pain                               5/5  Subscap / IR               5/5                                           5/5      Special Tests:              Apprehension:                         (-)                                             (-)              Zahira Relocation:                     (-)                                             (-)              Jerk / Posterior Load:              +                                              (-)              Neer:                                       (-)                                             (-)              Roque:                                 +                                              (-)              SS Stress:                               +                                              (-)              Bear Hug:                                (-)                                             (-)              Dowling's:                                 +                                              (-)              Resisted Thrower's:                (-)                                             (-)              Cross Arm Abduction:             +                                              (-)       Neurovascular examination  - Motor grossly intact bilaterally to shoulder abduction, elbow flexion and extension, wrist flexion and extension, and intrinsic hand musculature  - Sensation intact to light touch bilaterally in axillary, median, radial, and ulnar distributions  - Symmetrical radial pulses    Imaging:    XR Results:  Results for orders placed during the hospital encounter of 10/07/22    X-ray Shoulder 2 or More Views Right    Narrative  EXAMINATION:  Four radiographic views of the SHOULDER.    CLINICAL  HISTORY:  Pain in right shoulder    TECHNIQUE:  Four radiographic views of the SHOULDER    COMPARISON:  None.    FINDINGS:  Four views of the right shoulder demonstrate normal alignment.  There is mild osteoarthritis of the acromioclavicular joint.  There is a faint calcification adjacent to the greater tuberosity.  There is no fracture.  There is no soft tissue swelling.  The included right lung is clear.    Impression  Mild acromioclavicular osteoarthritis with findings suggesting calcific tendinitis of the rotator cuff.      Electronically signed by: Olivier Hinojosa MD  Date:    10/07/2022  Time:    07:32      MRI Results:  MRI Arthrogram Shoulder With Contrast Right  Narrative: EXAMINATION:  MRI ARTHROGRAM SHOULDER WITH CONTRAST RIGHT    CLINICAL HISTORY:  Shoulder pain, rotator cuff disorder suspected, xray done;Shoulder pain, labral tear suspected, xray done;  Pain in right shoulder    TECHNIQUE:  Multiplanar/multisequence MRI of the right shoulder was performed after intra-articular dilute 0.1 mL Gadavist.    COMPARISON:  None    FINDINGS:  Rotator cuff: Supraspinatus, infraspinatus, subscapularis, and teres minor tendons are intact.  Rotator cuff muscle bulk is preserved.    Labrum: Irregularity of the superior and anterior superior labrum favored represent labral tear versus less likely sublabral foramen    Long head of the biceps: Intact    Bones: Marrow signal is within normal limits with the exception of mild cystic change underlying the rotator cuff footprint.  No evidence of fracture or marrow replacement process.    AC joint: Within normal limits.    Cartilage: No large glenohumeral articular cartilage defect demonstrated on this non arthrographic study.  Impression: Irregularity of the superior and anterior superior labrum favored represent labral tear versus less likely sublabral foramen    Electronically signed by: Delonte Tong  Date:    12/29/2022  Time:    18:53  X-Ray Arthrogram Shoulder Right,  "COMPLETE (XPD)  Narrative: EXAMINATION:  XR ARTHROGRAM SHOULDER RIGHT, COMPLETE (XPD)    CLINICAL HISTORY:  Calcific tendinitis of right shoulder    TECHNIQUE:  Following written informed consent and discussion of applicable risks and benefits the patient was placed in the supine position on the examination table.  Using sterile technique and 1% lidocaine for local anesthesia a 22-gauge 3.5 "needle was advanced into the right glenohumeral joint under fluoroscopic guidance.  Subsequently, 15 cc of a 1:200 solution of gadolinium in normal saline and iodinated contrast material was instilled into the right glenohumeral joint.  The procedure was tolerated well with no immediate complications.    COMPARISON:  None    FINDINGS:  Single spot image obtained and demonstrates normal contrast opacification of the right glenohumeral joint.    Total fluoroscopy time: 0.6 minutes  Impression: Successful and uneventful right shoulder injection for MR arthrography.    Electronically signed by: Pravin Knowles MD  Date:    12/29/2022  Time:    15:01         CT Results:  No results found for this or any previous visit.      Physician read: I agree with the above impression.    Assessment/Plan:   Vasile Norris is a 32 y.o. male with right shoulder SLAP tear and calcific tendonitis     Plan:    Reviewed MRI with the patient today. His MRI shows right shoulder SLAP tear. He also has evidence of calcific tendinitis on XR as well.  Discussed non-operative treatment options in the form of rest, activity modifications, oral anti-inflammatories, corticosteroid injections, and physical therapy versus operative treatment in the form of shoulder arthroscopy with labral repair.   The patient has tried considerable conservative treatment in the form of rest, activity modifications, oral anti-inflammatories, and corticosteroid injections. He has not tried formal physical therapy at this time. He does report that he is currently able to perform " all desired activities and functions. He is interested in operative treatment at some point in the future if symptoms do not improve but would like to wait until later in the year when it is better for his schedule.   For his calcific tendinitis, we also discussed the possibility of TENJET treatment for his calcific tendinitis but I feel that most of his symptoms are related to his labrum tear.   I the meantime we will refer him to physical therapy to work on strengthening and dynamic stabilization of his shoulder as well as referral to Dr. La for US evaluation and consideration of TENJET of right shoulder.   Referral for PT at the [facility] and consult with Dr. La placed today.   Follow-up with me as needed.             Eddie Sheridan MD    I, Alexandro House, acted as a scribe for Eddie Sheridan MD for the duration of this office visit.

## 2022-12-29 ENCOUNTER — HOSPITAL ENCOUNTER (OUTPATIENT)
Dept: RADIOLOGY | Facility: HOSPITAL | Age: 32
Discharge: HOME | End: 2022-12-29
Attending: STUDENT IN AN ORGANIZED HEALTH CARE EDUCATION/TRAINING PROGRAM
Payer: COMMERCIAL

## 2022-12-29 DIAGNOSIS — M75.31 CALCIFIC TENDINITIS OF RIGHT SHOULDER REGION: ICD-10-CM

## 2022-12-29 DIAGNOSIS — M25.511 RIGHT SHOULDER PAIN, UNSPECIFIED CHRONICITY: ICD-10-CM

## 2022-12-29 DIAGNOSIS — S43.431A TEAR OF RIGHT GLENOID LABRUM, INITIAL ENCOUNTER: ICD-10-CM

## 2022-12-29 PROCEDURE — 73222 MRI ARTHROGRAM SHOULDER WITH CONTRAST RIGHT: ICD-10-PCS | Mod: 26,RT,, | Performed by: RADIOLOGY

## 2022-12-29 PROCEDURE — 23350 INJECTION FOR SHOULDER X-RAY: CPT | Mod: RT

## 2022-12-29 PROCEDURE — 73222 MRI JOINT UPR EXTREM W/DYE: CPT | Mod: TC,RT

## 2022-12-29 PROCEDURE — 73040 XR ARTHROGRAM SHOULDER RIGHT, COMPLETE (XPD): ICD-10-PCS | Mod: 26,RT,, | Performed by: RADIOLOGY

## 2022-12-29 PROCEDURE — 73040 CONTRAST X-RAY OF SHOULDER: CPT | Mod: 26,RT,, | Performed by: RADIOLOGY

## 2022-12-29 PROCEDURE — 25500020 PHARM REV CODE 255: Performed by: STUDENT IN AN ORGANIZED HEALTH CARE EDUCATION/TRAINING PROGRAM

## 2022-12-29 PROCEDURE — 73222 MRI JOINT UPR EXTREM W/DYE: CPT | Mod: 26,RT,, | Performed by: RADIOLOGY

## 2022-12-29 PROCEDURE — 23350 INJECTION FOR SHOULDER X-RAY: CPT | Mod: RT,,, | Performed by: RADIOLOGY

## 2022-12-29 PROCEDURE — A9585 GADOBUTROL INJECTION: HCPCS | Performed by: STUDENT IN AN ORGANIZED HEALTH CARE EDUCATION/TRAINING PROGRAM

## 2022-12-29 PROCEDURE — 23350 XR ARTHROGRAM SHOULDER RIGHT, COMPLETE (XPD): ICD-10-PCS | Mod: RT,,, | Performed by: RADIOLOGY

## 2022-12-29 PROCEDURE — 25000003 PHARM REV CODE 250: Performed by: STUDENT IN AN ORGANIZED HEALTH CARE EDUCATION/TRAINING PROGRAM

## 2022-12-29 RX ORDER — LIDOCAINE HYDROCHLORIDE 10 MG/ML
1 INJECTION INFILTRATION; PERINEURAL ONCE
Status: COMPLETED | OUTPATIENT
Start: 2022-12-29 | End: 2022-12-29

## 2022-12-29 RX ORDER — GADOBUTROL 604.72 MG/ML
0.1 INJECTION INTRAVENOUS
Status: COMPLETED | OUTPATIENT
Start: 2022-12-29 | End: 2022-12-29

## 2022-12-29 RX ADMIN — IOHEXOL 20 ML: 350 INJECTION, SOLUTION INTRAVENOUS at 02:12

## 2022-12-29 RX ADMIN — GADOBUTROL 0.1 ML: 604.72 INJECTION INTRAVENOUS at 02:12

## 2022-12-29 RX ADMIN — Medication 10 ML: at 04:12

## 2022-12-30 ENCOUNTER — CLINICAL SUPPORT (OUTPATIENT)
Dept: REHABILITATION | Facility: HOSPITAL | Age: 32
End: 2022-12-30
Attending: STUDENT IN AN ORGANIZED HEALTH CARE EDUCATION/TRAINING PROGRAM
Payer: COMMERCIAL

## 2022-12-30 ENCOUNTER — OFFICE VISIT (OUTPATIENT)
Dept: ORTHOPEDICS | Facility: CLINIC | Age: 32
End: 2022-12-30
Payer: COMMERCIAL

## 2022-12-30 DIAGNOSIS — M75.31 CALCIFIC TENDINITIS OF RIGHT SHOULDER REGION: ICD-10-CM

## 2022-12-30 DIAGNOSIS — S43.431D SUPERIOR GLENOID LABRUM LESION OF RIGHT SHOULDER, SUBSEQUENT ENCOUNTER: ICD-10-CM

## 2022-12-30 DIAGNOSIS — R29.898 DECREASED STRENGTH OF UPPER EXTREMITY: ICD-10-CM

## 2022-12-30 DIAGNOSIS — M25.511 CHRONIC RIGHT SHOULDER PAIN: ICD-10-CM

## 2022-12-30 DIAGNOSIS — S43.431D SUPERIOR GLENOID LABRUM LESION OF RIGHT SHOULDER, SUBSEQUENT ENCOUNTER: Primary | ICD-10-CM

## 2022-12-30 DIAGNOSIS — G89.29 CHRONIC RIGHT SHOULDER PAIN: ICD-10-CM

## 2022-12-30 DIAGNOSIS — R52 PAIN AGGRAVATED BY ACTIVITIES OF DAILY LIVING: ICD-10-CM

## 2022-12-30 PROCEDURE — 99213 PR OFFICE/OUTPT VISIT, EST, LEVL III, 20-29 MIN: ICD-10-PCS | Mod: S$GLB,,, | Performed by: STUDENT IN AN ORGANIZED HEALTH CARE EDUCATION/TRAINING PROGRAM

## 2022-12-30 PROCEDURE — 1160F PR REVIEW ALL MEDS BY PRESCRIBER/CLIN PHARMACIST DOCUMENTED: ICD-10-PCS | Mod: CPTII,S$GLB,, | Performed by: STUDENT IN AN ORGANIZED HEALTH CARE EDUCATION/TRAINING PROGRAM

## 2022-12-30 PROCEDURE — 1159F PR MEDICATION LIST DOCUMENTED IN MEDICAL RECORD: ICD-10-PCS | Mod: CPTII,S$GLB,, | Performed by: STUDENT IN AN ORGANIZED HEALTH CARE EDUCATION/TRAINING PROGRAM

## 2022-12-30 PROCEDURE — 99999 PR PBB SHADOW E&M-EST. PATIENT-LVL III: CPT | Mod: PBBFAC,,, | Performed by: STUDENT IN AN ORGANIZED HEALTH CARE EDUCATION/TRAINING PROGRAM

## 2022-12-30 PROCEDURE — 99999 PR PBB SHADOW E&M-EST. PATIENT-LVL III: ICD-10-PCS | Mod: PBBFAC,,, | Performed by: STUDENT IN AN ORGANIZED HEALTH CARE EDUCATION/TRAINING PROGRAM

## 2022-12-30 PROCEDURE — 4010F PR ACE/ARB THEARPY RXD/TAKEN: ICD-10-PCS | Mod: CPTII,S$GLB,, | Performed by: STUDENT IN AN ORGANIZED HEALTH CARE EDUCATION/TRAINING PROGRAM

## 2022-12-30 PROCEDURE — 99213 OFFICE O/P EST LOW 20 MIN: CPT | Mod: S$GLB,,, | Performed by: STUDENT IN AN ORGANIZED HEALTH CARE EDUCATION/TRAINING PROGRAM

## 2022-12-30 PROCEDURE — 97110 THERAPEUTIC EXERCISES: CPT | Performed by: PHYSICAL THERAPIST

## 2022-12-30 PROCEDURE — 97161 PT EVAL LOW COMPLEX 20 MIN: CPT | Performed by: PHYSICAL THERAPIST

## 2022-12-30 PROCEDURE — 4010F ACE/ARB THERAPY RXD/TAKEN: CPT | Mod: CPTII,S$GLB,, | Performed by: STUDENT IN AN ORGANIZED HEALTH CARE EDUCATION/TRAINING PROGRAM

## 2022-12-30 PROCEDURE — 1160F RVW MEDS BY RX/DR IN RCRD: CPT | Mod: CPTII,S$GLB,, | Performed by: STUDENT IN AN ORGANIZED HEALTH CARE EDUCATION/TRAINING PROGRAM

## 2022-12-30 PROCEDURE — 1159F MED LIST DOCD IN RCRD: CPT | Mod: CPTII,S$GLB,, | Performed by: STUDENT IN AN ORGANIZED HEALTH CARE EDUCATION/TRAINING PROGRAM

## 2022-12-30 NOTE — PATIENT INSTRUCTIONS
Here is some general information on TenJet procedure. You may also watch the video on this link to see an overview of how the procedure goes as well.   TenJet Video

## 2022-12-31 PROBLEM — R52 PAIN AGGRAVATED BY ACTIVITIES OF DAILY LIVING: Status: ACTIVE | Noted: 2022-12-31

## 2022-12-31 PROBLEM — R29.898 DECREASED STRENGTH OF UPPER EXTREMITY: Status: ACTIVE | Noted: 2022-12-31

## 2022-12-31 NOTE — PLAN OF CARE
[facility]   Physical Therapy Initial Evaluation   Date: 12/30/2022   Name: Vasile Norris  Clinic Number: [id number]    Therapy Diagnosis:    Encounter Diagnoses   Name Primary?    Superior glenoid labrum lesion of right shoulder, subsequent encounter     Calcific tendinitis of right shoulder region     Chronic right shoulder pain     Pain aggravated by activities of daily living     Decreased strength of upper extremity       Physician: Eddie Sheridan     Physician Orders: PT Eval and Treat  Medical Diagnosis from Referral: Superior glenoid labrum lesion of right shoulder, subsequent encounter [S43.431D], Calcific tendinitis of right shoulder region [M75.31], Chronic right shoulder pain [M25.511, G89.29]  Evaluation Date: 12/30/2022  Authorization Period Expiration: 12/30/23  Plan of Care Expiration: 3/30/23  Progress Note Due: 1/30/23  Visit # / Visits authorized: 1/1   FOTO: 1/3 (last performed on 12/30/2022)    Precautions: Standard    Time In: 4:10  Time Out: 5:05  Total Billable Time (timed & untimed codes): 55 minutes    SUBJECTIVE   Date of onset: 3 years ago    History of current condition - Vasile reports Injured shoulder 3 years ago. Decided to go to doctor because he met deductible. X-ray showed calcific tendinitis. He was concerned about labral tear. MRI showed SLAP tear.    Imaging: [x] Xray [x] MRI [] CT: Performed on:   Impression:     Irregularity of the superior and anterior superior labrum favored represent labral tear versus less likely sublabral foramen    Pain:  Current 0/10, worst 8/10, best 0/10   Location: [x] Right   [] Left:  Anterior shoulder  Description: Dull and Burning  Aggravating Factors: Across body movements, can't put arm behind head, arm hanging into extension or cross body adduction. 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   Current Level of Function: Independent with all ADL, IADL, community mobility and functional activities with reports of increased pain and need for increased time and frequent breaks. Trouble with cross body and overhead movements    Dominant Extremity:    [x] Right    [] Left    Pts goals: Pt reported goals are to decrease overall pain levels in order to return to prior functional level.     Medical History:   Past Medical History:   Diagnosis Date    Allergy     Hypertension     Kidney stones     Migraines        Surgical History:   Vasile Norris  has a past surgical history that includes Dale tooth extraction; Pilonidal cyst drainage; nail avulsion; Lithotripsy (Right); and kidney stone removal.    Medications:   Vasile has a current medication list which includes the following prescription(s): amlodipine-olmesartan, botox, doxycycline, ondansetron, potassium citrate, and pramoxine.    Allergies:   Review of patient's allergies indicates:   Allergen Reactions    Codeine Hives    Nortriptyline Palpitations    Topiramate Other (See Comments)        OBJECTIVE     SSMP- Baseline symptoms with elevation- 6/10  Thoracic flexion- No change  Thoracic extension- No change (improved after CT manip)  Scapular retraction test- 5/10  Scapular assistance test- 5/10  Serratus activation- 5/10  RC activation- 5/10    Range of Motion:    Cervical Right   (spine) Left    Pain/Dysfunction with Movement Goal   Cervical Flexion (60º) 60 ---     Cervical Extension (80º) 40 ---     Cervical Rotation (75º) 60 60      ,   Shoulder AROM/PROM Right Left Pain/Dysfunction with Movement Goal   Shoulder Flexion (180º) 160 170 Pain 170   Shoulder Abduction (180º) 160 170 Pain 170   Shoulder Extension (60º) 20 40 Pain 50   Shoulder ER  at  90º (90º) 100 90     Shoulder IR at 90º (70º) 50 80  70   Functional ER Opp scap Opp scap     Functional IR T12 T7 Pain T7      Strength:    U/E MMT Right Left Pain/Dysfunction with Movement Goal   Shoulder Flexion 4+/5 5/5  4+/5 B   Shoulder Abduction 4+/5 5/5  4+/5 B   Shoulder IR 4+/5 5/5 Mild pain 4+/5 B   Shoulder ER 4/5 5/5 Pain 4+/5 B   Serratus Anterior 4/5 4+/5 Pain 4+/5 B   Middle Trapezius 3-/5 4/5 Pain 4+/5 B   Lower Trapezius Unable   4+/5 B      Muscle Length:       Muscle Tested  Right Left  Limitation Goal   Pectoralis Minor [] Normal      [x] Limited  5 fingers [] Normal      [x] Limited  4 finger  Normal B  3 fingers   Pectoralis Major [x] Normal      [] Limited [x] Normal      [] Limited  Normal B   Latissimus Mary [] Normal      [x] Limited [] Normal      [x] Limited         Joint Mobility:     Joint Motion  Right Mobility  (spine) Left Mobility Goal   Thoracic PA  [x] Hypo T1-4     [x] Normal  T5-12  [] Hyper   --- Normal   Glenohumeral inferior  [x] Hypo     [] Normal     [] Hyper [] Hypo     [x] Normal     [] Hyper Normal    Glenohumeral posterior [x] Hypo     [] Normal     [] Hyper [] Hypo     [x] Normal     [] Hyper Normal        Special Tests:     Right  (spine) Left  Goal   Anterior Apprehension [] Positive    [x] Negative [] Positive    [x] Negative Negative B    Posterior Apprehension  [] Positive    [x] Negative [] Positive    [x] Negative Negative B    Sulcus Sign  [] Positive    [x] Negative [] Positive    [x] Negative Negative B    Roque Dario  [x] Positive    [] Negative [] Positive    [x] Negative Negative B    Lift Off [] Positive    [x] Negative [] Positive    [x] Negative    Horizontal Adduction Test  [x] Positive    [] Negative [] Positive    [x] Negative Negative B        Sensation:  [x] Intact to Light Touch   [] Impaired:    Palpation: Increased tone and tenderness noted with palpation of right biceps tendon/anterior fibers deltoid, infraspinatus. I    Posture:  Pt  presents with postural abnormalities which include:   B scapular abduction (5 fingers)  B scapular anterior tipping  Flat mid thoracic spine, increased upper thoracic kyphosis    Function:     CMS Impairment/Limitation/Restriction for FOTO Shoulder Survey    Therapist reviewed FOTO scores for Vasile on 12/30/2022.   FOTO documents entered into [employer] - see Media section.    Limitation Score: 33%         TREATMENT     Total Treatment time (time-based codes) separate from Evaluation: (10) minutes     Vasile received the treatments listed below:      Vasile received the following manual therapy techniques: Joint mobilizations were applied to the: thoracic spine for 3 minutes, including:       Performed Today    Joint mobilization [x] Seated CT gapping manipulation (improved symptoms with shoulder elevation after)    []     []     []      Plan for Next Visit:          THERAPEUTIC EXERCISES to develop strength, endurance, ROM, flexibility, posture, and core stabilization for (10) minutes including:    HEP instruction x 5 minutes    Intervention Performed Today    Pec minor self STM x  2 min   Prone humeral extension x 2 x 10   Wall serratus press x 2 x 10   CTJ extension     Prone T     Side lying ER                 Plan for Next Visit: See above       PATIENT EDUCATION AND HOME EXERCISES     Education provided: (5) minutes  PURPOSE: Patient educated on the impairments noted above and the effects of physical therapy intervention to improve overall condition and QOL.   EXERCISE: Patient was educated on all the above exercise prior/during/after for proper posture, positioning, and execution for safe performance with home exercise program.   POSTURE: Patient educated on postural awareness to reduce stress and maintain optimal alignment of the spine with static positions and dynamic movement     Written Home Exercises Provided: yes.  Exercises were reviewed and Vasile was able to demonstrate them prior to the end of the session.   "Vasile demonstrated good  understanding of the education provided. See EMR under Patient Instructions for exercises provided during therapy sessions.    ASSESSMENT     Vasile is a 32 y.o. male referred to outpatient Physical Therapy with a medical diagnosis of R SLAP tear and calcific tendinitis. Pt presents with impairments including: decreased ROM, decreased strength, decreased joint mobility, and postural abnormalities.    Pt prognosis is Excellent.   Pt will benefit from skilled outpatient Physical Therapy to address the deficits stated above and in the chart below, provide pt/family education, and to maximize pt's level of independence.     Plan of care discussed with patient: Yes  Pt's spiritual, cultural and educational needs considered and patient is agreeable to the plan of care and goals as stated below:     Anticipated Barriers for therapy: none    Medical Necessity is demonstrated by the following  History  Co-morbidities and personal factors that may impact the plan of care Co-morbidities:   Past Medical History:   Diagnosis Date    Allergy     Hypertension     Kidney stones     Migraines        Personal Factors:   []Age   []Education   []Coping style   []Social background   []Lifestyle    []Character   []Attitudes   []Other:   [x]No deficits      [x]Low   []Moderate  []High    Examination  Body Structures and Functions, activity limitations and participation restrictions that may impact the plan of care Body Regions:   []Head  []Neck   []Back   []Trunk  [x]Upper extremities   []Lower extremities   []Other:      Body Systems:    []Gross symmetry   [x]ROM   [x]Strength   []Gross coordinated movement   []Balance   []Gait []Transfers  []Transitions   [x]Motor control   []Motor learning   []Scar formation  []Other:       Participation Restrictions:   See above in "Current Level of Function"     Activity limitations:   Learning and applying knowledge  [x]No deficits       General Tasks and Commands  [x]No " deficits    Communication  [x]No deficits    Mobility   []No deficits  [x]Fine hand use  []Walking   [x]Driving [x]Lifting/carrying objects  []Using Transportation   []Moving around using equipment  []Other:      Self care  []No deficits  [x]Washing oneself   []Caring for body parts   []Toileting   []Dressing  []Eating   []Drinking   []Looking after one's health  []Other:        Domestic Life  []No Deficits  []Shopping   [x]Cooking  [x]Doing housework  []Assisting others   []Other:      Interactions/Relationships  [x]No deficits    Life Areas  [x]No deficits    Community and Social Life  [x]Community life  [x]Recreation and leisure   []Restorationist and spirituality  []Human rights   []Political life / citizenship  []No deficits      [x]Low   []Moderate  []High    Clinical Presentation [x]Stable and uncomplicated   []Evolving presentation with changing characteristics  []Unstable presentation with unpredictable characteristics [x]Low   []Moderate  []High      Decision Making/ Complexity Score:   [x]Low   []Moderate  []High        Short Term Goals:  4 weeks Status  Date Met   PAIN: Pt will report worst pain of 4/10 in order to progress toward max functional ability and improve quality of life. [x] Progressing  [] Met  [] Not Met    FUNCTION: Patient will demonstrate improved function as indicated by a functional limitation score of less than or equal to 30 out of 100 on FOTO. [x] Progressing  [] Met  [] Not Met    MOBILITY: Patient will improve AROM to 50% of stated goals, listed in objective measures above, in order to progress towards independence with functional activities.  [x] Progressing  [] Met  [] Not Met    STRENGTH: Patient will improve strength to 50% of stated goals, listed in objective measures above, in order to progress towards independence with functional activities.  [x] Progressing  [] Met  [] Not Met    POSTURE: Patient will correct postural deviations in sitting and standing, to decrease pain and  promote long term stability.  [x] Progressing  [] Met  [] Not Met    HEP: Patient will demonstrate independence with HEP in order to progress toward functional independence. [x] Progressing  [] Met  [] Not Met      Long Term Goals:  8 weeks Status Date Met   PAIN: Pt will report worst pain of 2/10 in order to progress toward max functional ability and improve quality of life [x] Progressing  [] Met  [] Not Met    FUNCTION: Patient will demonstrate improved function as indicated by a functional limitation score of less than or equal to 24 out of 100 on FOTO. [x] Progressing  [] Met  [] Not Met    MOBILITY: Patient will improve AROM to stated goals, listed in objective measures above, in order to return to maximal functional potential and improve quality of life. [x] Progressing  [] Met  [] Not Met    STRENGTH: Patient will improve strength to stated goals, listed in objective measures above, in order to improve functional independence and quality of life. [x] Progressing  [] Met  [] Not Met    Patient will return to normal ADL's, IADL's, community involvement, recreational activities, and work-related activities with less than or equal to 2/10 pain and maximal function.  [x] Progressing  [] Met  [] Not Met      PLAN   Plan of care Certification: 12/30/2022 to 3/30/23.    Outpatient Physical Therapy 2 times weekly for 8 weeks to include any combination of the following interventions: virtual visits, dry needling, modalities, electrical stimulation (IFC, Pre-Mod, Attended with Functional Dry Needling), Cervical/Lumbar Traction, Gait Training, Manual Therapy, Neuromuscular Re-ed, Patient Education, Self Care, Therapeutic Exercise, and Therapeutic Activites     Beto Rome, PT, DPT      I CERTIFY THE NEED FOR THESE SERVICES FURNISHED UNDER THIS PLAN OF TREATMENT AND WHILE UNDER MY CARE   Physician's comments:     Physician's Signature: ___________________________________________________

## 2023-01-04 ENCOUNTER — OFFICE VISIT (OUTPATIENT)
Dept: INTERNAL MEDICINE | Facility: CLINIC | Age: 33
End: 2023-01-04
Payer: COMMERCIAL

## 2023-01-04 ENCOUNTER — LAB VISIT (OUTPATIENT)
Dept: LAB | Facility: HOSPITAL | Age: 33
End: 2023-01-04
Attending: FAMILY MEDICINE
Payer: COMMERCIAL

## 2023-01-04 ENCOUNTER — CLINICAL SUPPORT (OUTPATIENT)
Dept: REHABILITATION | Facility: HOSPITAL | Age: 33
End: 2023-01-04
Payer: COMMERCIAL

## 2023-01-04 VITALS
SYSTOLIC BLOOD PRESSURE: 120 MMHG | HEART RATE: 82 BPM | DIASTOLIC BLOOD PRESSURE: 86 MMHG | OXYGEN SATURATION: 98 % | BODY MASS INDEX: 30.13 KG/M2 | WEIGHT: 222.44 LBS | HEIGHT: 72 IN | TEMPERATURE: 98 F

## 2023-01-04 DIAGNOSIS — Z00.00 ANNUAL PHYSICAL EXAM: ICD-10-CM

## 2023-01-04 DIAGNOSIS — J30.89 NON-SEASONAL ALLERGIC RHINITIS, UNSPECIFIED TRIGGER: Chronic | ICD-10-CM

## 2023-01-04 DIAGNOSIS — Z00.00 ANNUAL PHYSICAL EXAM: Primary | ICD-10-CM

## 2023-01-04 DIAGNOSIS — G43.009 MIGRAINE WITHOUT AURA AND WITHOUT STATUS MIGRAINOSUS, NOT INTRACTABLE: Chronic | ICD-10-CM

## 2023-01-04 DIAGNOSIS — R52 PAIN AGGRAVATED BY ACTIVITIES OF DAILY LIVING: Primary | ICD-10-CM

## 2023-01-04 DIAGNOSIS — I10 ESSENTIAL HYPERTENSION: ICD-10-CM

## 2023-01-04 DIAGNOSIS — R29.898 DECREASED STRENGTH OF UPPER EXTREMITY: ICD-10-CM

## 2023-01-04 DIAGNOSIS — N20.0 KIDNEY STONES: Chronic | ICD-10-CM

## 2023-01-04 PROBLEM — G89.29 CHRONIC BILATERAL LOW BACK PAIN WITH BILATERAL SCIATICA: Chronic | Status: ACTIVE | Noted: 2019-06-17

## 2023-01-04 PROBLEM — S43.431A SUPERIOR GLENOID LABRUM LESION OF RIGHT SHOULDER: Status: ACTIVE | Noted: 2023-01-04

## 2023-01-04 PROBLEM — S43.431A SUPERIOR GLENOID LABRUM LESION OF RIGHT SHOULDER: Chronic | Status: ACTIVE | Noted: 2023-01-04

## 2023-01-04 PROBLEM — M54.42 CHRONIC BILATERAL LOW BACK PAIN WITH BILATERAL SCIATICA: Chronic | Status: ACTIVE | Noted: 2019-06-17

## 2023-01-04 PROBLEM — M54.41 CHRONIC BILATERAL LOW BACK PAIN WITH BILATERAL SCIATICA: Chronic | Status: ACTIVE | Noted: 2019-06-17

## 2023-01-04 PROBLEM — E29.1 TESTICULAR FAILURE: Chronic | Status: ACTIVE | Noted: 2022-10-17

## 2023-01-04 LAB
ALBUMIN SERPL BCP-MCNC: 4.2 G/DL (ref 3.5–5.2)
ALP SERPL-CCNC: 81 U/L (ref 55–135)
ALT SERPL W/O P-5'-P-CCNC: 29 U/L (ref 10–44)
ANION GAP SERPL CALC-SCNC: 8 MMOL/L (ref 8–16)
AST SERPL-CCNC: 17 U/L (ref 10–40)
BILIRUB SERPL-MCNC: 0.3 MG/DL (ref 0.1–1)
BUN SERPL-MCNC: 18 MG/DL (ref 6–20)
CALCIUM SERPL-MCNC: 9.4 MG/DL (ref 8.7–10.5)
CHLORIDE SERPL-SCNC: 105 MMOL/L (ref 95–110)
CHOLEST SERPL-MCNC: 188 MG/DL (ref 120–199)
CHOLEST/HDLC SERPL: 5.2 {RATIO} (ref 2–5)
CO2 SERPL-SCNC: 27 MMOL/L (ref 23–29)
CREAT SERPL-MCNC: 1 MG/DL (ref 0.5–1.4)
ERYTHROCYTE [DISTWIDTH] IN BLOOD BY AUTOMATED COUNT: 12.5 % (ref 11.5–14.5)
EST. GFR  (NO RACE VARIABLE): >60 ML/MIN/1.73 M^2
ESTIMATED AVG GLUCOSE: 105 MG/DL (ref 68–131)
GLUCOSE SERPL-MCNC: 79 MG/DL (ref 70–110)
HBA1C MFR BLD: 5.3 % (ref 4–5.6)
HCT VFR BLD AUTO: 47.1 % (ref 40–54)
HDLC SERPL-MCNC: 36 MG/DL (ref 40–75)
HDLC SERPL: 19.1 % (ref 20–50)
HGB BLD-MCNC: 15.3 G/DL (ref 14–18)
LDLC SERPL CALC-MCNC: 92.6 MG/DL (ref 63–159)
MCH RBC QN AUTO: 27.8 PG (ref 27–31)
MCHC RBC AUTO-ENTMCNC: 32.5 G/DL (ref 32–36)
MCV RBC AUTO: 86 FL (ref 82–98)
NONHDLC SERPL-MCNC: 152 MG/DL
PLATELET # BLD AUTO: 284 K/UL (ref 150–450)
PMV BLD AUTO: 11.1 FL (ref 9.2–12.9)
POTASSIUM SERPL-SCNC: 4 MMOL/L (ref 3.5–5.1)
PROT SERPL-MCNC: 7.4 G/DL (ref 6–8.4)
RBC # BLD AUTO: 5.51 M/UL (ref 4.6–6.2)
SODIUM SERPL-SCNC: 140 MMOL/L (ref 136–145)
TRIGL SERPL-MCNC: 297 MG/DL (ref 30–150)
TSH SERPL DL<=0.005 MIU/L-ACNC: 1.41 UIU/ML (ref 0.4–4)
WBC # BLD AUTO: 7.03 K/UL (ref 3.9–12.7)

## 2023-01-04 PROCEDURE — 97110 THERAPEUTIC EXERCISES: CPT | Performed by: PHYSICAL THERAPIST

## 2023-01-04 PROCEDURE — 3008F BODY MASS INDEX DOCD: CPT | Mod: CPTII,S$GLB,, | Performed by: FAMILY MEDICINE

## 2023-01-04 PROCEDURE — 3079F DIAST BP 80-89 MM HG: CPT | Mod: CPTII,S$GLB,, | Performed by: FAMILY MEDICINE

## 2023-01-04 PROCEDURE — 80061 LIPID PANEL: CPT | Performed by: FAMILY MEDICINE

## 2023-01-04 PROCEDURE — 99395 PREV VISIT EST AGE 18-39: CPT | Mod: S$GLB,,, | Performed by: FAMILY MEDICINE

## 2023-01-04 PROCEDURE — 99395 PR PREVENTIVE VISIT,EST,18-39: ICD-10-PCS | Mod: S$GLB,,, | Performed by: FAMILY MEDICINE

## 2023-01-04 PROCEDURE — 80053 COMPREHEN METABOLIC PANEL: CPT | Performed by: FAMILY MEDICINE

## 2023-01-04 PROCEDURE — 97140 MANUAL THERAPY 1/> REGIONS: CPT | Performed by: PHYSICAL THERAPIST

## 2023-01-04 PROCEDURE — 3074F PR MOST RECENT SYSTOLIC BLOOD PRESSURE < 130 MM HG: ICD-10-PCS | Mod: CPTII,S$GLB,, | Performed by: FAMILY MEDICINE

## 2023-01-04 PROCEDURE — 1159F PR MEDICATION LIST DOCUMENTED IN MEDICAL RECORD: ICD-10-PCS | Mod: CPTII,S$GLB,, | Performed by: FAMILY MEDICINE

## 2023-01-04 PROCEDURE — 1160F PR REVIEW ALL MEDS BY PRESCRIBER/CLIN PHARMACIST DOCUMENTED: ICD-10-PCS | Mod: CPTII,S$GLB,, | Performed by: FAMILY MEDICINE

## 2023-01-04 PROCEDURE — 1159F MED LIST DOCD IN RCRD: CPT | Mod: CPTII,S$GLB,, | Performed by: FAMILY MEDICINE

## 2023-01-04 PROCEDURE — 3079F PR MOST RECENT DIASTOLIC BLOOD PRESSURE 80-89 MM HG: ICD-10-PCS | Mod: CPTII,S$GLB,, | Performed by: FAMILY MEDICINE

## 2023-01-04 PROCEDURE — 83036 HEMOGLOBIN GLYCOSYLATED A1C: CPT | Performed by: FAMILY MEDICINE

## 2023-01-04 PROCEDURE — 99999 PR PBB SHADOW E&M-EST. PATIENT-LVL IV: CPT | Mod: PBBFAC,,, | Performed by: FAMILY MEDICINE

## 2023-01-04 PROCEDURE — 85027 COMPLETE CBC AUTOMATED: CPT | Performed by: FAMILY MEDICINE

## 2023-01-04 PROCEDURE — 3074F SYST BP LT 130 MM HG: CPT | Mod: CPTII,S$GLB,, | Performed by: FAMILY MEDICINE

## 2023-01-04 PROCEDURE — 3008F PR BODY MASS INDEX (BMI) DOCUMENTED: ICD-10-PCS | Mod: CPTII,S$GLB,, | Performed by: FAMILY MEDICINE

## 2023-01-04 PROCEDURE — 97112 NEUROMUSCULAR REEDUCATION: CPT | Performed by: PHYSICAL THERAPIST

## 2023-01-04 PROCEDURE — 99999 PR PBB SHADOW E&M-EST. PATIENT-LVL IV: ICD-10-PCS | Mod: PBBFAC,,, | Performed by: FAMILY MEDICINE

## 2023-01-04 PROCEDURE — 36415 COLL VENOUS BLD VENIPUNCTURE: CPT | Performed by: FAMILY MEDICINE

## 2023-01-04 PROCEDURE — 1160F RVW MEDS BY RX/DR IN RCRD: CPT | Mod: CPTII,S$GLB,, | Performed by: FAMILY MEDICINE

## 2023-01-04 PROCEDURE — 84443 ASSAY THYROID STIM HORMONE: CPT | Performed by: FAMILY MEDICINE

## 2023-01-04 RX ORDER — AMLODIPINE AND OLMESARTAN MEDOXOMIL 5; 20 MG/1; MG/1
1 TABLET ORAL DAILY
Qty: 90 TABLET | Refills: 3 | Status: SHIPPED | OUTPATIENT
Start: 2023-01-04 | End: 2024-02-16

## 2023-01-04 NOTE — PROGRESS NOTES
Physical Therapy Daily Treatment Note     Name: Vasile Amato [facility] Number: [id number]    Therapy Diagnosis: No diagnosis found.  Physician: Eddie Sheridan*    Physician Orders: PT Eval and Treat  Medical Diagnosis from Referral: Superior glenoid labrum lesion of right shoulder, subsequent encounter [S43.431D], Calcific tendinitis of right shoulder region [M75.31], Chronic right shoulder pain [M25.511, G89.29]  Evaluation Date: 12/30/2022  Authorization Period Expiration: 12/30/23  Plan of Care Expiration: 3/30/23  Progress Note Due: 1/30/23  Visit # / Visits authorized: 1/1   FOTO: 1/3 (last performed on 12/30/2022)    Time In: 8:00  Time Out: 9:00  Total Billable Time: 60 minutes Billing reflects 1:1 direct supervision      Precautions: Standard    Subjective     Pt reports: Shoulder felt a little bit better after the evaulation, but he is still having pain with the same activities.  He was compliant with home exercise program.  Response to previous treatment: Slightly improved symptoms  Functional change: No change yet    Pain: 0/10  Location: right Anterior shoulder     Objective     Primary Impairments: Scapular control, shoulder mobility/range of motion/flexibility, CTJ stiffness    FOTO:  -Intake: 12/30 (33%)  -Status: incomplete  -Discharge: incomplete      Treatment     Vasile received the following manual therapy techniques: Joint mobilizations were applied to the: thoracic spine for 10 minutes, including:       Performed Today     Mobility assessment [x]  Impaired CTJ extension, T6 extension  Normal GH mobility today   Joint mobilization [x]  Seated CT gapping manipulation   Supine Mid thoracic manipulation     []        []        []         Plan for Next Visit:       THERAPEUTIC EXERCISES to develop strength, endurance, ROM, flexibility, posture, and core stabilization for (10) minutes including:     Intervention Performed Today     Pec minor, infraspinatus self STM x  2 min   Wall serratus  press x 2 x 10   Side lying ER  x  2# 3 x 10                      Plan for Next Visit: See above        Vasile participated in neuromuscular re-education activities to improve: Balance, Coordination, Kinesthetic, Sense, and Proprioception for 25 minutes. The following activities were included:     Performed Today    Quad thoracic rotation [x] 10 x ea   Prone humeral extension with thoracic extension [x] 3 x 10   Wall serratus press [x] 3 x 10   Quadruped chin tuck [x] 3 x 10   Prone T [x] 3 x 10   Cable row [x] 15# 3 x 10    []     []     []      Plan for Next Visit:        Home Exercises Provided and Patient Education Provided     Education provided:   - Physical therapy diagnosis, prognosis, and plan of care.   - Educated regarding upper quarter biomechanics and implications for rehab strategy    Written Home Exercises Provided: yes.  Exercises were reviewed and Vasile was able to demonstrate them prior to the end of the session.  Vasile demonstrated good  understanding of the education provided.     See EMR under Patient Instructions for exercises provided 1/4/2023.    Assessment     Patient continues to have anterior shoulder symptoms with overhead shoulder motions. Mild improvement with manual. He tolerated introduction of scapulothoracic exercises without increase in symptoms.    Vasile Is progressing well towards his goals.   Pt prognosis is Excellent.     Pt will continue to benefit from skilled outpatient physical therapy to address the deficits listed in the problem list box on initial evaluation, provide pt/family education and to maximize pt's level of independence in the home and community environment.     Pt's spiritual, cultural and educational needs considered and pt agreeable to plan of care and goals.    Anticipated barriers to physical therapy: None    Goals:        Short Term Goals:  4 weeks Status  Date Met   PAIN: Pt will report worst pain of 4/10 in order to progress toward max functional ability  and improve quality of life. [x] Progressing  [] Met  [] Not Met     FUNCTION: Patient will demonstrate improved function as indicated by a functional limitation score of less than or equal to 30 out of 100 on FOTO. [x] Progressing  [] Met  [] Not Met     MOBILITY: Patient will improve AROM to 50% of stated goals, listed in objective measures above, in order to progress towards independence with functional activities.  [x] Progressing  [] Met  [] Not Met     STRENGTH: Patient will improve strength to 50% of stated goals, listed in objective measures above, in order to progress towards independence with functional activities.  [x] Progressing  [] Met  [] Not Met     POSTURE: Patient will correct postural deviations in sitting and standing, to decrease pain and promote long term stability.  [x] Progressing  [] Met  [] Not Met     HEP: Patient will demonstrate independence with HEP in order to progress toward functional independence. [x] Progressing  [] Met  [] Not Met        Long Term Goals:  8 weeks Status Date Met   PAIN: Pt will report worst pain of 2/10 in order to progress toward max functional ability and improve quality of life [x] Progressing  [] Met  [] Not Met     FUNCTION: Patient will demonstrate improved function as indicated by a functional limitation score of less than or equal to 24 out of 100 on FOTO. [x] Progressing  [] Met  [] Not Met     MOBILITY: Patient will improve AROM to stated goals, listed in objective measures above, in order to return to maximal functional potential and improve quality of life. [x] Progressing  [] Met  [] Not Met     STRENGTH: Patient will improve strength to stated goals, listed in objective measures above, in order to improve functional independence and quality of life. [x] Progressing  [] Met  [] Not Met     Patient will return to normal ADL's, IADL's, community involvement, recreational activities, and work-related activities with less than or equal to 2/10 pain and  maximal function.  [x] Progressing  [] Met  [] Not Met          Plan       Continue to progress per patient tolerance.       Beto Rome, PT

## 2023-01-04 NOTE — PROGRESS NOTES
Subjective:   Patient ID: Vasile Norris is a 32 y.o. male.  Chief Complaint:  Annual Exam    Patient presents for annual physical exam      Last annual physical exam February 2022  Blood Counts are normal. No significant anemia.  Thyroid testing is normal.  Cholesterol tests are normal. 10-year risk of a heart disease or stroke is low. Aspirin daily is not recommended. A statin cholesterol medication is not recommended.  Sugar, Kidney, Liver, and Electrolyte tests are all normal.     Medical history for:   - Hypertension. Controlled Naya 5/20 mg daily.  Reports compliance.  Denies side effects.  No shortness of breath or swelling.    - Allergic rhino sinusitis.  Stable on over-the-counter nonsedating antihistamines and Flonase as needed.  Currently off Singulair.  - Migraine headaches.  Sees neurology.  Receives Botox injections. Imitrex and Zofran p.r.n. Still effective.    - Nephrolithiasis.  Sees urology.  Urocit-K for prevention.      Family history for prostate cancer, but no colon cancer  Other than kidney stones, no active  symptoms   Tetanus booster up-to-date  No documented COVID vaccines  No recent flu vaccines  Previous hepatitis-C and HIV screening negative     Currently undergoing physical therapy for shoulder pain and superior labrum tear, otherwise doing well     No new complaints or concerns today    Review of Systems   Constitutional:  Negative for activity change, chills, fatigue, fever and unexpected weight change.   HENT:  Negative for congestion, dental problem, ear discharge, ear pain, hearing loss, postnasal drip, rhinorrhea, sinus pressure, sinus pain, sore throat and trouble swallowing.    Eyes:  Negative for pain, discharge, redness and visual disturbance.   Respiratory:  Negative for cough, chest tightness, shortness of breath and wheezing.    Cardiovascular:  Negative for chest pain, palpitations and leg swelling.   Gastrointestinal:  Negative for abdominal pain, blood in stool,  constipation, diarrhea, nausea and vomiting.   Endocrine: Negative for polydipsia, polyphagia and polyuria.   Genitourinary:  Negative for difficulty urinating, dysuria, flank pain, frequency, hematuria and urgency.   Musculoskeletal:  Negative for arthralgias, joint swelling, myalgias and neck pain.   Skin:  Negative for rash.   Neurological:  Negative for dizziness, syncope, weakness, light-headedness and headaches.   Hematological:  Negative for adenopathy.   Psychiatric/Behavioral:  Negative for confusion, dysphoric mood and sleep disturbance. The patient is not nervous/anxious.      Objective:   /86 (BP Location: Left arm, Patient Position: Sitting, BP Method: Large (Manual))   Pulse 82   Temp 97.9 °F (36.6 °C) (Tympanic)   Ht 6' (1.829 m)   Wt 100.9 kg (222 lb 7.1 oz)   SpO2 98%   BMI 30.17 kg/m²     Physical Exam  Vitals and nursing note reviewed.   Constitutional:       Appearance: Normal appearance. He is well-developed. He is obese.   HENT:      Right Ear: Hearing, tympanic membrane, ear canal and external ear normal.      Left Ear: Hearing, tympanic membrane, ear canal and external ear normal.      Nose: Nose normal. No mucosal edema, congestion or rhinorrhea.      Right Sinus: No maxillary sinus tenderness or frontal sinus tenderness.      Left Sinus: No maxillary sinus tenderness or frontal sinus tenderness.      Mouth/Throat:      Pharynx: Oropharynx is clear. Uvula midline. No pharyngeal swelling, oropharyngeal exudate or posterior oropharyngeal erythema.   Neck:      Thyroid: No thyroid mass, thyromegaly or thyroid tenderness.      Vascular: No JVD.   Cardiovascular:      Rate and Rhythm: Normal rate and regular rhythm.      Heart sounds: Normal heart sounds. No murmur heard.    No friction rub. No gallop.   Pulmonary:      Effort: Pulmonary effort is normal.      Breath sounds: Normal breath sounds and air entry. No wheezing, rhonchi or rales.   Abdominal:      General: There is no  distension.      Palpations: Abdomen is soft.      Tenderness: There is no abdominal tenderness.   Musculoskeletal:      Right lower leg: No edema.      Left lower leg: No edema.   Lymphadenopathy:      Cervical: No cervical adenopathy.   Skin:     Findings: No rash.   Neurological:      General: No focal deficit present.      Mental Status: He is alert. Mental status is at baseline.   Psychiatric:         Mood and Affect: Mood and affect normal.       Assessment:       ICD-10-CM ICD-9-CM   1. Annual physical exam  Z00.00 V70.0   2. Essential hypertension  I10 401.9   3. Non-seasonal allergic rhinitis, unspecified trigger  J30.89 477.8   4. Migraine without aura and without status migrainosus, not intractable  G43.009 346.10   5. Kidney stones  N20.0 592.0     Plan:   Annual physical exam  -     CBC Without Differential; Future; Expected date: 01/04/2023  -     Comprehensive Metabolic Panel; Future; Expected date: 01/04/2023  -     Lipid Panel; Future; Expected date: 01/04/2023  -     TSH; Future; Expected date: 01/04/2023  -     Hemoglobin A1C; Future; Expected date: 01/04/2023  Blood pressure normal.  BMI 30.  Exam unremarkable.    Check labs.  Treat as indicated.    Colon cancer screening at 45 years old  Prostate cancer screening at 40 years old  Tetanus booster up-to-date  Declines COVID vaccines  Declines flu vaccine     Essential hypertension  -     amlodipine-olmesartan (ANUP) 5-20 mg per tablet; Take 1 tablet by mouth once daily.  Dispense: 90 tablet; Refill: 3  Controlled.  Stable.  Asymptomatic.  BP at goal.    Continue current medication     Non-seasonal allergic rhinitis, unspecified trigger  Stable.  Symptoms controlled.    Continue over-the-counter daily nonsedating antihistamine and Flonase nasal spray as needed  Okay to remain off Singulair     Migraine without aura and without status migrainosus, not intractable  Continue current treatment with Botox, Imitrex, and Zofran per Neurology   Follow-up  neurology as scheduled    Kidney stones  Stable.  Asymptomatic.  No recurrence since last annual physical exam   Continue Urocit-K   Follow-up neurology as scheduled    Return to clinic 1 year for annual physical exam or sooner if needed

## 2023-01-06 ENCOUNTER — CLINICAL SUPPORT (OUTPATIENT)
Dept: REHABILITATION | Facility: HOSPITAL | Age: 33
End: 2023-01-06
Payer: COMMERCIAL

## 2023-01-06 ENCOUNTER — OFFICE VISIT (OUTPATIENT)
Dept: SPORTS MEDICINE | Facility: CLINIC | Age: 33
End: 2023-01-06
Payer: COMMERCIAL

## 2023-01-06 VITALS — WEIGHT: 222 LBS | BODY MASS INDEX: 30.07 KG/M2 | HEIGHT: 72 IN

## 2023-01-06 DIAGNOSIS — M75.31 CALCIFIC TENDINITIS OF RIGHT SHOULDER REGION: ICD-10-CM

## 2023-01-06 DIAGNOSIS — M25.511 CHRONIC RIGHT SHOULDER PAIN: ICD-10-CM

## 2023-01-06 DIAGNOSIS — S43.431D SUPERIOR GLENOID LABRUM LESION OF RIGHT SHOULDER, SUBSEQUENT ENCOUNTER: ICD-10-CM

## 2023-01-06 DIAGNOSIS — G89.29 CHRONIC RIGHT SHOULDER PAIN: ICD-10-CM

## 2023-01-06 DIAGNOSIS — R29.898 DECREASED STRENGTH OF UPPER EXTREMITY: ICD-10-CM

## 2023-01-06 DIAGNOSIS — R52 PAIN AGGRAVATED BY ACTIVITIES OF DAILY LIVING: Primary | ICD-10-CM

## 2023-01-06 PROCEDURE — 99214 PR OFFICE/OUTPT VISIT, EST, LEVL IV, 30-39 MIN: ICD-10-PCS | Mod: S$GLB,,, | Performed by: STUDENT IN AN ORGANIZED HEALTH CARE EDUCATION/TRAINING PROGRAM

## 2023-01-06 PROCEDURE — 3008F BODY MASS INDEX DOCD: CPT | Mod: CPTII,S$GLB,, | Performed by: STUDENT IN AN ORGANIZED HEALTH CARE EDUCATION/TRAINING PROGRAM

## 2023-01-06 PROCEDURE — 97112 NEUROMUSCULAR REEDUCATION: CPT | Performed by: PHYSICAL THERAPIST

## 2023-01-06 PROCEDURE — 3044F HG A1C LEVEL LT 7.0%: CPT | Mod: CPTII,S$GLB,, | Performed by: STUDENT IN AN ORGANIZED HEALTH CARE EDUCATION/TRAINING PROGRAM

## 2023-01-06 PROCEDURE — 99999 PR PBB SHADOW E&M-EST. PATIENT-LVL III: CPT | Mod: PBBFAC,,, | Performed by: STUDENT IN AN ORGANIZED HEALTH CARE EDUCATION/TRAINING PROGRAM

## 2023-01-06 PROCEDURE — 76882 US LMTD JT/FCL EVL NVASC XTR: CPT | Mod: 26,RT,S$GLB, | Performed by: STUDENT IN AN ORGANIZED HEALTH CARE EDUCATION/TRAINING PROGRAM

## 2023-01-06 PROCEDURE — 76882 PR  US,EXTREMITY,NONVASCULAR,REAL-TIME IMAGE,LIMITED: ICD-10-PCS | Mod: 26,RT,S$GLB, | Performed by: STUDENT IN AN ORGANIZED HEALTH CARE EDUCATION/TRAINING PROGRAM

## 2023-01-06 PROCEDURE — 99999 PR PBB SHADOW E&M-EST. PATIENT-LVL III: ICD-10-PCS | Mod: PBBFAC,,, | Performed by: STUDENT IN AN ORGANIZED HEALTH CARE EDUCATION/TRAINING PROGRAM

## 2023-01-06 PROCEDURE — 4010F PR ACE/ARB THEARPY RXD/TAKEN: ICD-10-PCS | Mod: CPTII,S$GLB,, | Performed by: STUDENT IN AN ORGANIZED HEALTH CARE EDUCATION/TRAINING PROGRAM

## 2023-01-06 PROCEDURE — 97140 MANUAL THERAPY 1/> REGIONS: CPT | Performed by: PHYSICAL THERAPIST

## 2023-01-06 PROCEDURE — 1159F PR MEDICATION LIST DOCUMENTED IN MEDICAL RECORD: ICD-10-PCS | Mod: CPTII,S$GLB,, | Performed by: STUDENT IN AN ORGANIZED HEALTH CARE EDUCATION/TRAINING PROGRAM

## 2023-01-06 PROCEDURE — 3044F PR MOST RECENT HEMOGLOBIN A1C LEVEL <7.0%: ICD-10-PCS | Mod: CPTII,S$GLB,, | Performed by: STUDENT IN AN ORGANIZED HEALTH CARE EDUCATION/TRAINING PROGRAM

## 2023-01-06 PROCEDURE — 99214 OFFICE O/P EST MOD 30 MIN: CPT | Mod: S$GLB,,, | Performed by: STUDENT IN AN ORGANIZED HEALTH CARE EDUCATION/TRAINING PROGRAM

## 2023-01-06 PROCEDURE — 1159F MED LIST DOCD IN RCRD: CPT | Mod: CPTII,S$GLB,, | Performed by: STUDENT IN AN ORGANIZED HEALTH CARE EDUCATION/TRAINING PROGRAM

## 2023-01-06 PROCEDURE — 4010F ACE/ARB THERAPY RXD/TAKEN: CPT | Mod: CPTII,S$GLB,, | Performed by: STUDENT IN AN ORGANIZED HEALTH CARE EDUCATION/TRAINING PROGRAM

## 2023-01-06 PROCEDURE — 3008F PR BODY MASS INDEX (BMI) DOCUMENTED: ICD-10-PCS | Mod: CPTII,S$GLB,, | Performed by: STUDENT IN AN ORGANIZED HEALTH CARE EDUCATION/TRAINING PROGRAM

## 2023-01-06 NOTE — PROGRESS NOTES
Physical Therapy Daily Treatment Note     Name: Vasile Amato [facility] Number: [id number]    Therapy Diagnosis:   Encounter Diagnoses   Name Primary?    Pain aggravated by activities of daily living Yes    Decreased strength of upper extremity      Physician: Eddie Sheridan*    Physician Orders: PT Eval and Treat  Medical Diagnosis from Referral: Superior glenoid labrum lesion of right shoulder, subsequent encounter [S43.431D], Calcific tendinitis of right shoulder region [M75.31], Chronic right shoulder pain [M25.511, G89.29]  Evaluation Date: 12/30/2022  Authorization Period Expiration: 12/30/23  Plan of Care Expiration: 3/30/23  Progress Note Due: 1/30/23  Visit # / Visits authorized: 2/20   FOTO: 1/3 (last performed on 12/30/2022)    Time In: 1:00  Time Out: 2:00  Total Billable Time: 60 minutes Billing reflects 1:1 direct supervision      Precautions: Standard    Subjective     Pt reports: Shoulder felt a little bit better after the evaulation, but he is still having pain with the same activities.  He was compliant with home exercise program.  Response to previous treatment: Slightly improved symptoms  Functional change: No change yet    Pain: 7/10, 4/10 post manual, 1/10 post DN  Location: right Anterior shoulder     Objective     Primary Impairments: Scapular control, shoulder mobility/range of motion/flexibility, CTJ stiffness    FOTO:  -Intake: 12/30 (33%)  -Status: incomplete  -Discharge: incomplete      Treatment     Vasile received the following manual therapy techniques: Joint mobilizations were applied to the: thoracic spine for 25 minutes, including:       Performed Today     Mobility assessment [x]  Impaired CTJ extension, T6 extension  Normal GH mobility today   Joint mobilization [x]  Seated CT gapping manipulation   Supine Mid thoracic manipulation     []        []       Dry needling [x]   R anterior deltoid, infraspinatus      Dry needling was performed to muscles listed above to  decrease inflammation, increase circulation, decrease pain and restore homeostasis. All needles were removed and changes in signs and symptoms were noted.  Patient tolerated treatment well without any adverse effects. Dry needling consent form was reviewed with the patient addressing all questions and concerns and signed by patient.  Copy of the consent form was provided to patient and copy of consent form scanned to patient Epic chart.    Plan for Next Visit:       THERAPEUTIC EXERCISES to develop strength, endurance, ROM, flexibility, posture, and core stabilization for (5) minutes including:     Intervention Performed Today     Pec minor, infraspinatus self STM x  2 min   Side lying ER   2# 3 x 10                      Plan for Next Visit: See above        Vasile participated in neuromuscular re-education activities to improve: Balance, Coordination, Kinesthetic, Sense, and Proprioception for 25 minutes. The following activities were included:     Performed Today    Quad thoracic rotation [x] 15 x ea   Prone humeral extension with thoracic extension and chin tuck [x] 3 x 10   Wall serratus press [x] 3 x 10   Quadruped chin tuck [] 3 x 10   Prone T [x] 3 x 10 arms rested on table   Cable row [x] 15# 3 x 10    []     []     []      Plan for Next Visit:        Home Exercises Provided and Patient Education Provided     Education provided:   - Physical therapy diagnosis, prognosis, and plan of care.   - Educated regarding upper quarter biomechanics and implications for rehab strategy    Written Home Exercises Provided: yes.  Exercises were reviewed and Vasile was able to demonstrate them prior to the end of the session.  Vasile demonstrated good  understanding of the education provided.     See EMR under Patient Instructions for exercises provided 1/4/2023.    Assessment     Patient continues to have anterior shoulder symptoms with overhead shoulder motions. Significant improvement today with DN to anterior deltoid and  infraspinatus. Symptoms were re-irritated with serratus press exercise with shoulder flexion.     Vasile Is progressing well towards his goals.   Pt prognosis is Excellent.     Pt will continue to benefit from skilled outpatient physical therapy to address the deficits listed in the problem list box on initial evaluation, provide pt/family education and to maximize pt's level of independence in the home and community environment.     Pt's spiritual, cultural and educational needs considered and pt agreeable to plan of care and goals.    Anticipated barriers to physical therapy: None    Goals:        Short Term Goals:  4 weeks Status  Date Met   PAIN: Pt will report worst pain of 4/10 in order to progress toward max functional ability and improve quality of life. [x] Progressing  [] Met  [] Not Met     FUNCTION: Patient will demonstrate improved function as indicated by a functional limitation score of less than or equal to 30 out of 100 on FOTO. [x] Progressing  [] Met  [] Not Met     MOBILITY: Patient will improve AROM to 50% of stated goals, listed in objective measures above, in order to progress towards independence with functional activities.  [x] Progressing  [] Met  [] Not Met     STRENGTH: Patient will improve strength to 50% of stated goals, listed in objective measures above, in order to progress towards independence with functional activities.  [x] Progressing  [] Met  [] Not Met     POSTURE: Patient will correct postural deviations in sitting and standing, to decrease pain and promote long term stability.  [x] Progressing  [] Met  [] Not Met     HEP: Patient will demonstrate independence with HEP in order to progress toward functional independence. [x] Progressing  [] Met  [] Not Met        Long Term Goals:  8 weeks Status Date Met   PAIN: Pt will report worst pain of 2/10 in order to progress toward max functional ability and improve quality of life [x] Progressing  [] Met  [] Not Met     FUNCTION:  Patient will demonstrate improved function as indicated by a functional limitation score of less than or equal to 24 out of 100 on FOTO. [x] Progressing  [] Met  [] Not Met     MOBILITY: Patient will improve AROM to stated goals, listed in objective measures above, in order to return to maximal functional potential and improve quality of life. [x] Progressing  [] Met  [] Not Met     STRENGTH: Patient will improve strength to stated goals, listed in objective measures above, in order to improve functional independence and quality of life. [x] Progressing  [] Met  [] Not Met     Patient will return to normal ADL's, IADL's, community involvement, recreational activities, and work-related activities with less than or equal to 2/10 pain and maximal function.  [x] Progressing  [] Met  [] Not Met          Plan       Continue to progress per patient tolerance.       Beto Rome, PT

## 2023-01-06 NOTE — PROGRESS NOTES
Patient ID: Vaslie Norris  YOB: 1990  MRN: [id number]    Chief Complaint: Injury and Pain of the Right Shoulder    Referred By: Eddie Sheridan MD for right shoulder TENJET consult    History of Present Illness: Vasile Norris is a right-hand dominant 32 y.o. male who presents today with chronic right shoulder pain.     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 He has previously been seen by him with an extensive workup on chronic right shoulder pain with a MR-arthrogram of right shoulder revealing a SLAP tear and calcific tendinitis.He states that overhead movements, cross body active and passive and reaching behind back aggravates symptoms the most. Endorses ache and burn at anterior shoulder near rotator cuff interval. He has had a subacromial corticosteroid injection in October of last year that completely resolved his pain. Pain has been present for 3 years status post pushing himself out of bed. He is looking for some sort of pain relief and to push off invasive surgery at this time.     Past Medical History:   Past Medical History:   Diagnosis Date    Allergy     Hypertension     Kidney stones     Migraines      Past Surgical History:   Procedure Laterality Date    kidney stone removal      LITHOTRIPSY Right     nail avulsion      PILONIDAL CYST DRAINAGE      TONSILLECTOMY      WISDOM TOOTH EXTRACTION       Family History   Problem Relation Age of Onset    Hypertension Father     Prostate cancer Father     Cancer Father         Prostate cancer    Seizures Mother     Colon cancer Neg Hx      Social History     Socioeconomic History    Marital status:    Tobacco Use    Smoking status: Never    Smokeless tobacco: Never   Substance and Sexual Activity    Alcohol use: Yes     Alcohol/week: 1.0 standard drink     Types: 1 Cans of beer per week      Comment: occasionally    Drug use: No    Sexual activity: Yes     Partners: Female     Birth control/protection: None     Social Determinants of Health     Financial Resource Strain: Low Risk     Difficulty of Paying Living Expenses: Not hard at all   Food Insecurity: No Food Insecurity    Worried About Running Out of Food in the Last Year: Never true    Ran Out of Food in the Last Year: Never true   Transportation Needs: No Transportation Needs    Lack of Transportation (Medical): No    Lack of Transportation (Non-Medical): No   Physical Activity: Sufficiently Active    Days of Exercise per Week: 6 days    Minutes of Exercise per Session: 150+ min   Stress: No Stress Concern Present    Feeling of Stress : Not at all   Social Connections: Unknown    Frequency of Communication with Friends and Family: More than three times a week    Frequency of Social Gatherings with Friends and Family: Once a week    Active Member of Clubs or Organizations: Yes    Attends Club or Organization Meetings: More than 4 times per year    Marital Status:    Housing Stability: Low Risk     Unable to Pay for Housing in the Last Year: No    Number of Places Lived in the Last Year: 1    Unstable Housing in the Last Year: No     Medication List with Changes/Refills   Current Medications    AMLODIPINE-OLMESARTAN (ANUP) 5-20 MG PER TABLET    Take 1 tablet by mouth once daily.    BOTOX 200 UNIT SOLR        ONDANSETRON (ZOFRAN) 4 MG TABLET    TAKE ONE BY MOUTH EVERY 8 HOURS AS NEEDED, FOR NAUSEA    POTASSIUM CITRATE (UROCIT-K) 10 MEQ (1,080 MG) TBSR    Take 1 tablet by mouth once daily.    PRAMOXINE 1 % FOAM    Place rectally 3 (three) times daily as needed.     Review of patient's allergies indicates:   Allergen Reactions    Codeine Hives    Nortriptyline Palpitations    Topiramate Other (See Comments)       Physical Exam:   Body mass index is 30.11 kg/m².    GENERAL: Well appearing, in no acute distress.  HEAD: Normocephalic and  atraumatic.  ENT: External ears and nose grossly normal.  EYES: EOMI bilaterally  PULMONARY: Respirations are grossly even and non-labored.  NEURO: Awake, alert, and oriented x 3.  SKIN: No obvious rashes appreciated.  PSYCH: Mood & affect are appropriate.    Detailed MSK exam:   Pain at end range shoulder ROM. Resisted shoulder ER weakness and pain. Positive impingement sign. Neer's positive. Positive painful arc. Positive empty can pain and weakness. Negative speeds.     Imaging:  MRI Arthrogram Shoulder With Contrast Right  Narrative: EXAMINATION:  MRI ARTHROGRAM SHOULDER WITH CONTRAST RIGHT    CLINICAL HISTORY:  Shoulder pain, rotator cuff disorder suspected, xray done;Shoulder pain, labral tear suspected, xray done;  Pain in right shoulder    TECHNIQUE:  Multiplanar/multisequence MRI of the right shoulder was performed after intra-articular dilute 0.1 mL Gadavist.    COMPARISON:  None    FINDINGS:  Rotator cuff: Supraspinatus, infraspinatus, subscapularis, and teres minor tendons are intact.  Rotator cuff muscle bulk is preserved.    Labrum: Irregularity of the superior and anterior superior labrum favored represent labral tear versus less likely sublabral foramen    Long head of the biceps: Intact    Bones: Marrow signal is within normal limits with the exception of mild cystic change underlying the rotator cuff footprint.  No evidence of fracture or marrow replacement process.    AC joint: Within normal limits.    Cartilage: No large glenohumeral articular cartilage defect demonstrated on this non arthrographic study.  Impression: Irregularity of the superior and anterior superior labrum favored represent labral tear versus less likely sublabral foramen    Electronically signed by: Delonte Tong  Date:    12/29/2022  Time:    18:53  X-Ray Arthrogram Shoulder Right, COMPLETE (XPD)  Narrative: EXAMINATION:  XR ARTHROGRAM SHOULDER RIGHT, COMPLETE (XPD)    CLINICAL HISTORY:  Calcific tendinitis of right  "shoulder    TECHNIQUE:  Following written informed consent and discussion of applicable risks and benefits the patient was placed in the supine position on the examination table.  Using sterile technique and 1% lidocaine for local anesthesia a 22-gauge 3.5 "needle was advanced into the right glenohumeral joint under fluoroscopic guidance.  Subsequently, 15 cc of a 1:200 solution of gadolinium in normal saline and iodinated contrast material was instilled into the right glenohumeral joint.  The procedure was tolerated well with no immediate complications.    COMPARISON:  None    FINDINGS:  Single spot image obtained and demonstrates normal contrast opacification of the right glenohumeral joint.    Total fluoroscopy time: 0.6 minutes  Impression: Successful and uneventful right shoulder injection for MR arthrography.    Electronically signed by: Pravin Knowles MD  Date:    12/29/2022  Time:    15:01    FOCUSED:  1. Diagnostic Extremity - MSK-Sports Ultrasound was recommended due to right shoulder pain.  2. Diagnostic Extremity - MSK-Sports Ultrasound Performed: Deric La Extremity Study: right shoulder.    TECHNIQUE: Real time ultrasound examination of the right shoulder was performed with SonoSite Edge 2, 9-L MHz linear probe(s).     FINDINGS: The images are of adequate diagnostic quality with identification of all echogenic structures made except for the vascular structures unless otherwise noted. There is no sonographic evidence of periosteal abnormalities, soft tissue edema, musculotendinous or nerve irregularities.  The right shoulder was visualized in long and short axis.  The supraspinatus had well visualized calcific densities on exam as well as some underlying subacromial bursitis noted as well.. The rest of the sonographic examination was unremarkable.  Ultrasound images were directly reviewed with the patient and then a treatment plan was discussed.  The images were saved and stored for documentation " in the EMR.     IMPRESSION:  Calcific tendinitis of the supraspinatus tendon with underlying subacromial bursitis.  Medial subluxation of the biceps tendon of the bicipital groove.    Relevant imaging results were reviewed and interpreted by me and per my read as above.  This was discussed with the patient and / or family today.     Assessment:  Vasile Norris is a 32 y.o. male presents today for persistent right shoulder pain with MRI findings consistent with SLAP pathology.  Has persistent symptoms impingement type findings on exam today.  He does have well corticated calcific densities through the supraspinatus tendon.  He is interested in minimally invasive tendon debridement.  We discussed the procedure at length and he would like to move forward.  We will schedule for a right shoulder TENJET    Superior glenoid labrum lesion of right shoulder, subsequent encounter  -     Ambulatory referral/consult to Orthopedics    Calcific tendinitis of right shoulder region  -     Ambulatory referral/consult to Orthopedics  -     Tenotomy shoulder area; Future    Chronic right shoulder pain  -     Ambulatory referral/consult to Orthopedics         A copy of today's visit note has been sent to the referring provider.       Deric La MD    Disclaimer: This note was prepared using a voice recognition system and is likely to have sound alike errors within the text.

## 2023-01-07 ENCOUNTER — PATIENT MESSAGE (OUTPATIENT)
Dept: SPORTS MEDICINE | Facility: CLINIC | Age: 33
End: 2023-01-07

## 2023-01-09 ENCOUNTER — CLINICAL SUPPORT (OUTPATIENT)
Dept: REHABILITATION | Facility: HOSPITAL | Age: 33
End: 2023-01-09
Payer: COMMERCIAL

## 2023-01-09 DIAGNOSIS — R52 PAIN AGGRAVATED BY ACTIVITIES OF DAILY LIVING: Primary | ICD-10-CM

## 2023-01-09 DIAGNOSIS — R29.898 DECREASED STRENGTH OF UPPER EXTREMITY: ICD-10-CM

## 2023-01-09 PROCEDURE — 97112 NEUROMUSCULAR REEDUCATION: CPT | Performed by: PHYSICAL THERAPIST

## 2023-01-09 PROCEDURE — 97140 MANUAL THERAPY 1/> REGIONS: CPT | Performed by: PHYSICAL THERAPIST

## 2023-01-09 NOTE — PROGRESS NOTES
Physical Therapy Daily Treatment Note     Name: Vasile Amato [facility] Number: [id number]    Therapy Diagnosis:   Encounter Diagnoses   Name Primary?    Pain aggravated by activities of daily living Yes    Decreased strength of upper extremity        Physician: Eddie Sheridan    Physician Orders: PT Eval and Treat  Medical Diagnosis from Referral: Superior glenoid labrum lesion of right shoulder, subsequent encounter [S43.431D], Calcific tendinitis of right shoulder region [M75.31], Chronic right shoulder pain [M25.511, G89.29]  Evaluation Date: 12/30/2022  Authorization Period Expiration: 12/30/23  Plan of Care Expiration: 3/30/23  Progress Note Due: 1/30/23  Visit # / Visits authorized: 3/20   FOTO: 1/3 (last performed on 12/30/2022)    Time In: 3:00  Time Out: 4:00  Total Billable Time: 30 minutes Billing reflects 1:1 direct supervision      Precautions: Standard    Subjective     Pt reports: He got a lot of improvement after dry needling but the pain returned somewhat with exercise.  He was compliant with home exercise program.  Response to previous treatment: Slightly improved symptoms  Functional change: No change yet    Pain: 7/10, 4/10 post manual, 1/10 post DN  Location: right Anterior shoulder     Objective     Primary Impairments: Scapular control, shoulder mobility/range of motion/flexibility, CTJ stiffness    FOTO:  -Intake: 12/30 (33%)  -Status: incomplete  -Discharge: incomplete      Treatment     Vasile received the following manual therapy techniques: Joint mobilizations were applied to the: thoracic spine for 15 minutes, including:       Performed Today     Mobility assessment [x]  Impaired CTJ extension, T6 extension  Normal GH mobility today   Joint mobilization [x]  Seated CT gapping manipulation      []        []       Dry needling [x]   R infraspinatus      Dry needling was performed to muscles listed above to decrease inflammation, increase circulation, decrease pain and restore  homeostasis. All needles were removed and changes in signs and symptoms were noted.  Patient tolerated treatment well without any adverse effects. Dry needling consent form was reviewed with the patient addressing all questions and concerns and signed by patient.  Copy of the consent form was provided to patient and copy of consent form scanned to patient Epic chart.    Plan for Next Visit:       THERAPEUTIC EXERCISES to develop strength, endurance, ROM, flexibility, posture, and core stabilization for (00) minutes including:     Intervention Performed Today     Pec minor, infraspinatus self STM x  2 min                      Plan for Next Visit: See above        Vasile participated in neuromuscular re-education activities to improve: Balance, Coordination, Kinesthetic, Sense, and Proprioception for 15 minutes. The following activities were included:    Some exercises were performed, not billed due to lack of one on one supervision    Impaired scapular control with shoulder IR noted     Performed Today    Quad thoracic rotation [x] 15 x ea   Prone humeral extension with thoracic extension and chin tuck [x] 3 x 10 on ball, 4#   Shoulder taps [x] 3 x 10   Quadruped chin tuck [] 3 x 10   Side lying ER [x] 2# 3 x burn   Side lying shoulder flexion [x] 2 x 10   Prone T [x] 3 x 10 arms rested on table   Cable row [x] 15# 3 x 10   Prone shoulder IR [x] 3 minutes    []     []      Plan for Next Visit:        Home Exercises Provided and Patient Education Provided     Education provided:   - Physical therapy diagnosis, prognosis, and plan of care.   - Educated regarding upper quarter biomechanics and implications for rehab strategy    Written Home Exercises Provided: yes.  Exercises were reviewed and Vasile was able to demonstrate them prior to the end of the session.  Vasile demonstrated good  understanding of the education provided.     See EMR under Patient Instructions for exercises provided 1/4/2023.    Assessment      Patient had improved symptoms with dry needling today. Exercises were adjusted to decrease symptoms. Program was well tolerated today. Patient noted to have continued motor control deficits including scapula anterior tipping with shoulder IR.    Vasile Is progressing well towards his goals.   Pt prognosis is Excellent.     Pt will continue to benefit from skilled outpatient physical therapy to address the deficits listed in the problem list box on initial evaluation, provide pt/family education and to maximize pt's level of independence in the home and community environment.     Pt's spiritual, cultural and educational needs considered and pt agreeable to plan of care and goals.    Anticipated barriers to physical therapy: None    Goals:        Short Term Goals:  4 weeks Status  Date Met   PAIN: Pt will report worst pain of 4/10 in order to progress toward max functional ability and improve quality of life. [x] Progressing  [] Met  [] Not Met     FUNCTION: Patient will demonstrate improved function as indicated by a functional limitation score of less than or equal to 30 out of 100 on FOTO. [x] Progressing  [] Met  [] Not Met     MOBILITY: Patient will improve AROM to 50% of stated goals, listed in objective measures above, in order to progress towards independence with functional activities.  [x] Progressing  [] Met  [] Not Met     STRENGTH: Patient will improve strength to 50% of stated goals, listed in objective measures above, in order to progress towards independence with functional activities.  [x] Progressing  [] Met  [] Not Met     POSTURE: Patient will correct postural deviations in sitting and standing, to decrease pain and promote long term stability.  [x] Progressing  [] Met  [] Not Met     HEP: Patient will demonstrate independence with HEP in order to progress toward functional independence. [x] Progressing  [] Met  [] Not Met        Long Term Goals:  8 weeks Status Date Met   PAIN: Pt will report  worst pain of 2/10 in order to progress toward max functional ability and improve quality of life [x] Progressing  [] Met  [] Not Met     FUNCTION: Patient will demonstrate improved function as indicated by a functional limitation score of less than or equal to 24 out of 100 on FOTO. [x] Progressing  [] Met  [] Not Met     MOBILITY: Patient will improve AROM to stated goals, listed in objective measures above, in order to return to maximal functional potential and improve quality of life. [x] Progressing  [] Met  [] Not Met     STRENGTH: Patient will improve strength to stated goals, listed in objective measures above, in order to improve functional independence and quality of life. [x] Progressing  [] Met  [] Not Met     Patient will return to normal ADL's, IADL's, community involvement, recreational activities, and work-related activities with less than or equal to 2/10 pain and maximal function.  [x] Progressing  [] Met  [] Not Met          Plan       Continue to progress per patient tolerance.       Beto Rome, PT

## 2023-01-11 ENCOUNTER — CLINICAL SUPPORT (OUTPATIENT)
Dept: REHABILITATION | Facility: HOSPITAL | Age: 33
End: 2023-01-11
Payer: COMMERCIAL

## 2023-01-11 DIAGNOSIS — R29.898 DECREASED STRENGTH OF UPPER EXTREMITY: ICD-10-CM

## 2023-01-11 DIAGNOSIS — R52 PAIN AGGRAVATED BY ACTIVITIES OF DAILY LIVING: Primary | ICD-10-CM

## 2023-01-11 PROCEDURE — 97112 NEUROMUSCULAR REEDUCATION: CPT | Performed by: PHYSICAL THERAPIST

## 2023-01-11 PROCEDURE — 97140 MANUAL THERAPY 1/> REGIONS: CPT | Performed by: PHYSICAL THERAPIST

## 2023-01-11 NOTE — PROGRESS NOTES
Physical Therapy Daily Treatment Note     Name: Vasile Amato [facility] Number: [id number]    Therapy Diagnosis:   Encounter Diagnoses   Name Primary?    Pain aggravated by activities of daily living Yes    Decreased strength of upper extremity        Physician: Eddie Sheridan    Physician Orders: PT Eval and Treat  Medical Diagnosis from Referral: Superior glenoid labrum lesion of right shoulder, subsequent encounter [S43.431D], Calcific tendinitis of right shoulder region [M75.31], Chronic right shoulder pain [M25.511, G89.29]  Evaluation Date: 12/30/2022  Authorization Period Expiration: 12/30/23  Plan of Care Expiration: 3/30/23  Progress Note Due: 1/30/23  Visit # / Visits authorized: 4/20   FOTO: 1/3 (last performed on 12/30/2022)    Time In: 3:00  Time Out: 4:00  Total Billable Time: 30 minutes Billing reflects 1:1 direct supervision      Precautions: Standard    Subjective     Pt reports: He felt good after last treatment, not as much pain since the exercises didn't work through pain.   He was compliant with home exercise program.  Response to previous treatment: Slightly improved symptoms  Functional change: No change yet    Pain: 7/10  Location: right Anterior shoulder     Objective     Primary Impairments: Scapular control, shoulder mobility/range of motion/flexibility, CTJ stiffness    FOTO:  -Intake: 12/30 (33%)  -Status: incomplete  -Discharge: incomplete      Treatment     Vasile received the following manual therapy techniques: Joint mobilizations were applied to the: thoracic spine for 10 minutes, including:       Performed Today     Mobility assessment [x]  Impaired CTJ extension, T6 extension  Normal GH mobility today   Joint mobilization []  Seated CT gapping manipulation     Soft tissue mobilization [x]   Infraspinatus pin and stretch     []       Dry needling [x]   R infraspinatus      Dry needling was performed to muscles listed above to decrease inflammation, increase circulation,  decrease pain and restore homeostasis. All needles were removed and changes in signs and symptoms were noted.  Patient tolerated treatment well without any adverse effects. Dry needling consent form was reviewed with the patient addressing all questions and concerns and signed by patient.  Copy of the consent form was provided to patient and copy of consent form scanned to patient Epic chart.    Plan for Next Visit:       THERAPEUTIC EXERCISES to develop strength, endurance, ROM, flexibility, posture, and core stabilization for (00) minutes including:     Intervention Performed Today     Pec minor, infraspinatus self STM x  2 min                      Plan for Next Visit: See above        Vasile participated in neuromuscular re-education activities to improve: Balance, Coordination, Kinesthetic, Sense, and Proprioception for 40 minutes. The following activities were included:    Impaired scapular control with shoulder IR noted     Performed Today    Quad thoracic rotation [x] 15 x ea   Shoulder taps [x] 3 x 10   Ball on wall alphabet [x] 4 rounds to burn   Side lying ER [x] 3# 3 x burn   Side lying shoulder flexion [x] 3 x 10 2#   Prone humeral extension with thoracic extension and chin tuck [x] 3 x 10 with 4#   Prone T [] 3 x 10 arms rested on table   Cable row [x] Unilateral with thoracic rotation 3 x 10 with 20#   Prone shoulder IR [x] 2 minutes   Manual resist D2 flexion/extension [x] 2 x 15 ea    []      Plan for Next Visit:        Home Exercises Provided and Patient Education Provided     Education provided:   - Physical therapy diagnosis, prognosis, and plan of care.   - Educated regarding upper quarter biomechanics and implications for rehab strategy    Written Home Exercises Provided: yes.  Exercises were reviewed and Vasile was able to demonstrate them prior to the end of the session.  Vasile demonstrated good  understanding of the education provided.     See EMR under Patient Instructions for exercises  provided 1/4/2023.    Assessment     Patient presents today with continued high pain levels pre-treatment that are improved with soft tissue mobilization to posterior shoulder. We progressed toward some overhead strengthening/motor control exercises today without significant exacerbation of symptoms. Good fatigue with strengthening exercises.    Vasile Is progressing well towards his goals.   Pt prognosis is Excellent.     Pt will continue to benefit from skilled outpatient physical therapy to address the deficits listed in the problem list box on initial evaluation, provide pt/family education and to maximize pt's level of independence in the home and community environment.     Pt's spiritual, cultural and educational needs considered and pt agreeable to plan of care and goals.    Anticipated barriers to physical therapy: None    Goals:        Short Term Goals:  4 weeks Status  Date Met   PAIN: Pt will report worst pain of 4/10 in order to progress toward max functional ability and improve quality of life. [x] Progressing  [] Met  [] Not Met     FUNCTION: Patient will demonstrate improved function as indicated by a functional limitation score of less than or equal to 30 out of 100 on FOTO. [x] Progressing  [] Met  [] Not Met     MOBILITY: Patient will improve AROM to 50% of stated goals, listed in objective measures above, in order to progress towards independence with functional activities.  [x] Progressing  [] Met  [] Not Met     STRENGTH: Patient will improve strength to 50% of stated goals, listed in objective measures above, in order to progress towards independence with functional activities.  [x] Progressing  [] Met  [] Not Met     POSTURE: Patient will correct postural deviations in sitting and standing, to decrease pain and promote long term stability.  [x] Progressing  [] Met  [] Not Met     HEP: Patient will demonstrate independence with HEP in order to progress toward functional independence. [x]  Progressing  [] Met  [] Not Met        Long Term Goals:  8 weeks Status Date Met   PAIN: Pt will report worst pain of 2/10 in order to progress toward max functional ability and improve quality of life [x] Progressing  [] Met  [] Not Met     FUNCTION: Patient will demonstrate improved function as indicated by a functional limitation score of less than or equal to 24 out of 100 on FOTO. [x] Progressing  [] Met  [] Not Met     MOBILITY: Patient will improve AROM to stated goals, listed in objective measures above, in order to return to maximal functional potential and improve quality of life. [x] Progressing  [] Met  [] Not Met     STRENGTH: Patient will improve strength to stated goals, listed in objective measures above, in order to improve functional independence and quality of life. [x] Progressing  [] Met  [] Not Met     Patient will return to normal ADL's, IADL's, community involvement, recreational activities, and work-related activities with less than or equal to 2/10 pain and maximal function.  [x] Progressing  [] Met  [] Not Met          Plan       Continue to progress per patient tolerance.       Beto Roem, PT

## 2023-01-18 ENCOUNTER — CLINICAL SUPPORT (OUTPATIENT)
Dept: REHABILITATION | Facility: HOSPITAL | Age: 33
End: 2023-01-18
Payer: COMMERCIAL

## 2023-01-18 DIAGNOSIS — R52 PAIN AGGRAVATED BY ACTIVITIES OF DAILY LIVING: Primary | ICD-10-CM

## 2023-01-18 DIAGNOSIS — R29.898 DECREASED STRENGTH OF UPPER EXTREMITY: ICD-10-CM

## 2023-01-18 PROCEDURE — 97140 MANUAL THERAPY 1/> REGIONS: CPT | Performed by: PHYSICAL THERAPIST

## 2023-01-18 PROCEDURE — 97112 NEUROMUSCULAR REEDUCATION: CPT | Performed by: PHYSICAL THERAPIST

## 2023-01-18 PROCEDURE — 97110 THERAPEUTIC EXERCISES: CPT | Performed by: PHYSICAL THERAPIST

## 2023-01-20 ENCOUNTER — CLINICAL SUPPORT (OUTPATIENT)
Dept: REHABILITATION | Facility: HOSPITAL | Age: 33
End: 2023-01-20
Payer: COMMERCIAL

## 2023-01-20 DIAGNOSIS — R52 PAIN AGGRAVATED BY ACTIVITIES OF DAILY LIVING: Primary | ICD-10-CM

## 2023-01-20 DIAGNOSIS — R29.898 DECREASED STRENGTH OF UPPER EXTREMITY: ICD-10-CM

## 2023-01-20 PROCEDURE — 97110 THERAPEUTIC EXERCISES: CPT | Performed by: PHYSICAL THERAPIST

## 2023-01-20 PROCEDURE — 97140 MANUAL THERAPY 1/> REGIONS: CPT | Performed by: PHYSICAL THERAPIST

## 2023-01-20 PROCEDURE — 97112 NEUROMUSCULAR REEDUCATION: CPT | Performed by: PHYSICAL THERAPIST

## 2023-01-20 NOTE — PROGRESS NOTES
Physical Therapy Daily Treatment Note     Name: Vasile Amato [facility] Number: [id number]    Therapy Diagnosis:   Encounter Diagnoses   Name Primary?    Pain aggravated by activities of daily living Yes    Decreased strength of upper extremity        Physician: Eddie Sheridan    Physician Orders: PT Eval and Treat  Medical Diagnosis from Referral: Superior glenoid labrum lesion of right shoulder, subsequent encounter [S43.431D], Calcific tendinitis of right shoulder region [M75.31], Chronic right shoulder pain [M25.511, G89.29]  Evaluation Date: 12/30/2022  Authorization Period Expiration: 12/30/23  Plan of Care Expiration: 3/30/23  Progress Note Due: 1/30/23  Visit # / Visits authorized: 5/20     Time In: 2:05 pm  Time Out: 3:00 pm  Total Billable Time: 48 minutes Billing reflects 1:1 direct supervision      Precautions: Standard    Subjective     Pt reports: Shoulder is more sore today because he tried to pull on his sheets last night and the dog was on his sheets.  He was compliant with home exercise program.  Response to previous treatment: Slightly improved symptoms  Functional change: No change yet    Pain: 7/10  Location: right Anterior shoulder     Objective     Primary Impairments: Scapular control, shoulder mobility/range of motion/flexibility, CTJ stiffness    FOTO:  -Intake: 12/30 (33%)  -Status: incomplete  -Discharge: incomplete      Treatment     Vasile received the following manual therapy techniques: Joint mobilizations were applied to the: thoracic spine for 10 minutes, including:       Performed Today     Mobility assessment [x]  Impaired CTJ extension, T6 extension  Mild stiffness GH posterior/inferior mobility   Joint mobilization [x]  GH posterior/inferior mobvs    Soft tissue mobilization []   Infraspinatus pin and stretch     []       Dry needling []   R infraspinatus      Dry needling was performed to muscles listed above to decrease inflammation, increase circulation, decrease  pain and restore homeostasis. All needles were removed and changes in signs and symptoms were noted.  Patient tolerated treatment well without any adverse effects. Dry needling consent form was reviewed with the patient addressing all questions and concerns and signed by patient.  Copy of the consent form was provided to patient and copy of consent form scanned to patient Epic chart.    Plan for Next Visit:       THERAPEUTIC EXERCISES to develop strength, endurance, ROM, flexibility, posture, and core stabilization for (8) minutes including:     Intervention Performed Today     Pec minor, infraspinatus self STM x  2 min    Side lying ER x   2# 3 x burn    Side lying T  x  2# 3 x 10      Plan for Next Visit: See above        Vasile participated in neuromuscular re-education activities to improve: Balance, Coordination, Kinesthetic, Sense, and Proprioception for 30 minutes. The following activities were included:     Performed Today    Quad thoracic rotation [x] 15 x ea   Shoulder taps [x] 3 x 10   Ball on wall alphabet [x] 4 rounds to burn   Side lying shoulder flexion [x] 3 x 10 2#   Prone humeral extension with thoracic extension and chin tuck [x] 3 x 10 with 4#   Cable row [x] Unilateral with thoracic rotation 3 x 10 with 20#   Prone shoulder IR [x] 2 minutes   Cable D2 flexion/extension [x] 2 x 15 ea with 5#    []      Plan for Next Visit:        Home Exercises Provided and Patient Education Provided     Education provided:   - Physical therapy diagnosis, prognosis, and plan of care.   - Educated regarding upper quarter biomechanics and implications for rehab strategy    Written Home Exercises Provided: yes.  Exercises were reviewed and Vasile was able to demonstrate them prior to the end of the session.  Vasile demonstrated good  understanding of the education provided.     See EMR under Patient Instructions for exercises provided 1/4/2023.    Assessment     Patient has improvements in function but continues to  have persistent symptoms. Consistently good tolerance to current exercise program even with recent symptom exacerbation. Continue to work on upper quarter posture and motor control.    Vasile Is progressing well towards his goals.   Pt prognosis is Excellent.     Pt will continue to benefit from skilled outpatient physical therapy to address the deficits listed in the problem list box on initial evaluation, provide pt/family education and to maximize pt's level of independence in the home and community environment.     Pt's spiritual, cultural and educational needs considered and pt agreeable to plan of care and goals.    Anticipated barriers to physical therapy: None    Goals:        Short Term Goals:  4 weeks Status  Date Met   PAIN: Pt will report worst pain of 4/10 in order to progress toward max functional ability and improve quality of life. [x] Progressing  [] Met  [] Not Met     FUNCTION: Patient will demonstrate improved function as indicated by a functional limitation score of less than or equal to 30 out of 100 on FOTO. [x] Progressing  [] Met  [] Not Met     MOBILITY: Patient will improve AROM to 50% of stated goals, listed in objective measures above, in order to progress towards independence with functional activities.  [x] Progressing  [] Met  [] Not Met     STRENGTH: Patient will improve strength to 50% of stated goals, listed in objective measures above, in order to progress towards independence with functional activities.  [x] Progressing  [] Met  [] Not Met     POSTURE: Patient will correct postural deviations in sitting and standing, to decrease pain and promote long term stability.  [x] Progressing  [] Met  [] Not Met     HEP: Patient will demonstrate independence with HEP in order to progress toward functional independence. [x] Progressing  [] Met  [] Not Met        Long Term Goals:  8 weeks Status Date Met   PAIN: Pt will report worst pain of 2/10 in order to progress toward max functional  ability and improve quality of life [x] Progressing  [] Met  [] Not Met     FUNCTION: Patient will demonstrate improved function as indicated by a functional limitation score of less than or equal to 24 out of 100 on FOTO. [x] Progressing  [] Met  [] Not Met     MOBILITY: Patient will improve AROM to stated goals, listed in objective measures above, in order to return to maximal functional potential and improve quality of life. [x] Progressing  [] Met  [] Not Met     STRENGTH: Patient will improve strength to stated goals, listed in objective measures above, in order to improve functional independence and quality of life. [x] Progressing  [] Met  [] Not Met     Patient will return to normal ADL's, IADL's, community involvement, recreational activities, and work-related activities with less than or equal to 2/10 pain and maximal function.  [x] Progressing  [] Met  [] Not Met          Plan       Continue to progress per patient tolerance.       Beto Rome, PT

## 2023-01-25 ENCOUNTER — CLINICAL SUPPORT (OUTPATIENT)
Dept: REHABILITATION | Facility: HOSPITAL | Age: 33
End: 2023-01-25
Payer: COMMERCIAL

## 2023-01-25 DIAGNOSIS — R52 PAIN AGGRAVATED BY ACTIVITIES OF DAILY LIVING: Primary | ICD-10-CM

## 2023-01-25 DIAGNOSIS — R29.898 DECREASED STRENGTH OF UPPER EXTREMITY: ICD-10-CM

## 2023-01-25 PROCEDURE — 97110 THERAPEUTIC EXERCISES: CPT | Performed by: PHYSICAL THERAPIST

## 2023-01-25 PROCEDURE — 97112 NEUROMUSCULAR REEDUCATION: CPT | Performed by: PHYSICAL THERAPIST

## 2023-01-25 NOTE — PROGRESS NOTES
Physical Therapy Daily Treatment Note     Name: Vasile Amato [facility] Number: [id number]    Therapy Diagnosis:   Encounter Diagnoses   Name Primary?    Pain aggravated by activities of daily living Yes    Decreased strength of upper extremity        Physician: Eddie Sheridan    Physician Orders: PT Eval and Treat  Medical Diagnosis from Referral: Superior glenoid labrum lesion of right shoulder, subsequent encounter [S43.134D], Calcific tendinitis of right shoulder region [M75.31], Chronic right shoulder pain [M25.511, G89.29]  Evaluation Date: 12/30/2022  Authorization Period Expiration: 12/30/23  Plan of Care Expiration: 3/30/23  Progress Note Due: 2/25/23  Visit # / Visits authorized: 7/20     Time In: 4:45 pm  Time Out: 5:35 pm  Total Billable Time: 30 minutes Billing reflects 1:1 direct supervision      Precautions: Standard    Subjective     Pt reports: Shoulder has been feeling better, lower baseline pain but still feels it.  He was compliant with home exercise program.  Response to previous treatment: Slightly improved symptoms  Functional change: No change yet    Pain: 3/10  Location: right Anterior shoulder     Objective     Primary Impairments: AGMR movement impairment syndrome    FOTO:  -Intake: 12/30 (33%)  -Status: incomplete  -Discharge: incomplete    SSMP- Baseline symptoms with elevation- 6/10  Thoracic flexion- No change  Thoracic extension- No change (improved after CT manip)  Scapular retraction test- 5/10  Scapular assistance test- 5/10  Serratus activation- 5/10  RC activation- 5/10     Range of Motion:     Cervical Right   (spine) Left     Pain/Dysfunction with Movement Goal   Cervical Flexion (60º) 60 ---       Cervical Extension (80º) 40 ---       Cervical Rotation (75º) 60 60        ,   Shoulder AROM/PROM Right Left Pain/Dysfunction with Movement Goal   Shoulder Flexion (180º) 170 170 Pain 170   Shoulder Abduction (180º) 170 170 Pain 170   Shoulder Extension (60º) 30 40 Pain 50    Shoulder ER  at 90º (90º) 100 90       Shoulder IR at 90º (70º) 60 80   70   Functional ER Opp scap Opp scap       Functional IR T10 T7 Pain T7      Strength:     U/E MMT Right Left Pain/Dysfunction with Movement Goal   Shoulder Flexion 4+/5 5/5   4+/5 B   Shoulder Abduction 4+/5 5/5   4+/5 B   Shoulder IR 5/5 5/5 Mild pain 4+/5 B   Shoulder ER 4+/5 5/5 Pain 4+/5 B   Serratus Anterior 4/5 4+/5 Pain 4+/5 B   Upper trapezius 4+/5 4+/5     Middle Trapezius 3+/5 4/5 Pain 4+/5 B   Lower Trapezius Unable     4+/5 B   Subscap 3+/5 4+/5  4+/5 B      Muscle Length:         Muscle Tested  Right Left  Limitation Goal   Pectoralis Minor [x] Normal      [] Limited  3 fingers [x] Normal      [] Limited  3 finger   Normal B  3 fingers   Pectoralis Major [x] Normal      [] Limited [x] Normal      [] Limited   Normal B   Latissimus Mary [] Normal      [x] Limited [] Normal      [x] Limited             Joint Mobility:      Joint Motion  Right Mobility  (spine) Left Mobility Goal   Thoracic PA  [x] Hypo T4-5    [] Normal    [] Hyper    --- Normal   Glenohumeral inferior  [] Hypo     [x] Normal     [] Hyper [] Hypo     [x] Normal     [] Hyper Normal    Glenohumeral posterior [] Hypo     [x] Normal     [] Hyper [] Hypo     [x] Normal     [] Hyper Normal          Sensation:  [x] Intact to Light Touch                         [] Impaired:     Palpation: Increased tone and tenderness noted with palpation of right biceps tendon/anterior fibers deltoid, infraspinatus. I     Posture:  Pt presents with postural abnormalities which include:   B scapular abduction (5 fingers)  B scapular anterior tipping  Flat mid thoracic spine, increased upper thoracic kyphosis       Treatment     Vasile received the following manual therapy techniques: Joint mobilizations were applied to the: thoracic spine for 00 minutes, including:       Performed Today     Mobility assessment []  normal CTJ extension, T4-5 extension  Mild stiffness GH posterior/inferior  mobility   Joint mobilization []  GH posterior/inferior mobvs    Soft tissue mobilization []   Infraspinatus pin and stretch     []       Dry needling []   R infraspinatus      Dry needling was performed to muscles listed above to decrease inflammation, increase circulation, decrease pain and restore homeostasis. All needles were removed and changes in signs and symptoms were noted.  Patient tolerated treatment well without any adverse effects. Dry needling consent form was reviewed with the patient addressing all questions and concerns and signed by patient.  Copy of the consent form was provided to patient and copy of consent form scanned to patient Epic chart.    Plan for Next Visit:       THERAPEUTIC EXERCISES to develop strength, endurance, ROM, flexibility, posture, and core stabilization for (15) minutes including:     Strength and range of motion testing x 10 minutes    Intervention Performed Today     Pec minor, infraspinatus self STM   2 min    Side lying ER   2# 3 x burn    Side lying T    2# 3 x 10   Foam roller thoracic extension x  10x      Plan for Next Visit: See above        Vasile participated in neuromuscular re-education activities to improve: Balance, Coordination, Kinesthetic, Sense, and Proprioception for 15 minutes. The following activities were included:     Performed Today    Supine wall liliana x    Sahrmann middle trap x    Sahrmann lower trap x    Shoulder flexion with serratus activation x    90/90 IR with scapula stable x         Quad thoracic rotation  15 x ea   Shoulder taps  3 x 10   Ball on wall alphabet  4 rounds to burn   Side lying shoulder flexion  3 x 10 2#   Prone humeral extension with thoracic extension and chin tuck  3 x 10 with 4#   Cable row  Unilateral with thoracic rotation 3 x 10 with 20#   Prone shoulder IR  2 minutes   Cable D2 flexion/extension  2 x 15 ea with 5#          Plan for Next Visit:        Home Exercises Provided and Patient Education Provided     Education  provided:   - Physical therapy diagnosis, prognosis, and plan of care.   - Educated regarding upper quarter biomechanics and implications for rehab strategy    Written Home Exercises Provided: yes.  Exercises were reviewed and Vasile was able to demonstrate them prior to the end of the session.  Vasile demonstrated good  understanding of the education provided.     See EMR under Patient Instructions for exercises provided 1/4/2023.    Assessment     Patient has improvements in function but continues to have low level symptoms during the day. He was noted to have continued motor control deficits consistent with shoulder anterior glide medial rotation syndrome. Improved trapezius and serratus activity with motor control exercises.    Vasile Is progressing well towards his goals.   Pt prognosis is Excellent.     Pt will continue to benefit from skilled outpatient physical therapy to address the deficits listed in the problem list box on initial evaluation, provide pt/family education and to maximize pt's level of independence in the home and community environment.     Pt's spiritual, cultural and educational needs considered and pt agreeable to plan of care and goals.    Anticipated barriers to physical therapy: None    Goals:        Short Term Goals:  4 weeks Status  Date Met   PAIN: Pt will report worst pain of 4/10 in order to progress toward max functional ability and improve quality of life. [] Progressing  [x] Met  [] Not Met     FUNCTION: Patient will demonstrate improved function as indicated by a functional limitation score of less than or equal to 30 out of 100 on FOTO. [x] Progressing  [] Met  [] Not Met     MOBILITY: Patient will improve AROM to 50% of stated goals, listed in objective measures above, in order to progress towards independence with functional activities.  [x] Progressing  [] Met  [] Not Met     STRENGTH: Patient will improve strength to 50% of stated goals, listed in objective measures above,  in order to progress towards independence with functional activities.  [x] Progressing  [] Met  [] Not Met     POSTURE: Patient will correct postural deviations in sitting and standing, to decrease pain and promote long term stability.  [x] Progressing  [] Met  [] Not Met     HEP: Patient will demonstrate independence with HEP in order to progress toward functional independence. [] Progressing  [x] Met  [] Not Met        Long Term Goals:  8 weeks Status Date Met   PAIN: Pt will report worst pain of 2/10 in order to progress toward max functional ability and improve quality of life [x] Progressing  [] Met  [] Not Met     FUNCTION: Patient will demonstrate improved function as indicated by a functional limitation score of less than or equal to 24 out of 100 on FOTO. [x] Progressing  [] Met  [] Not Met     MOBILITY: Patient will improve AROM to stated goals, listed in objective measures above, in order to return to maximal functional potential and improve quality of life. [x] Progressing  [] Met  [] Not Met     STRENGTH: Patient will improve strength to stated goals, listed in objective measures above, in order to improve functional independence and quality of life. [x] Progressing  [] Met  [] Not Met     Patient will return to normal ADL's, IADL's, community involvement, recreational activities, and work-related activities with less than or equal to 2/10 pain and maximal function.  [x] Progressing  [] Met  [] Not Met          Plan       Continue to progress per patient tolerance.       Beto Rome, PT

## 2023-02-06 ENCOUNTER — CLINICAL SUPPORT (OUTPATIENT)
Dept: REHABILITATION | Facility: HOSPITAL | Age: 33
End: 2023-02-06
Payer: COMMERCIAL

## 2023-02-06 DIAGNOSIS — R29.898 DECREASED STRENGTH OF UPPER EXTREMITY: ICD-10-CM

## 2023-02-06 DIAGNOSIS — R52 PAIN AGGRAVATED BY ACTIVITIES OF DAILY LIVING: Primary | ICD-10-CM

## 2023-02-06 PROCEDURE — 97140 MANUAL THERAPY 1/> REGIONS: CPT | Performed by: PHYSICAL THERAPIST

## 2023-02-06 PROCEDURE — 97112 NEUROMUSCULAR REEDUCATION: CPT | Performed by: PHYSICAL THERAPIST

## 2023-02-06 PROCEDURE — 97110 THERAPEUTIC EXERCISES: CPT | Performed by: PHYSICAL THERAPIST

## 2023-02-06 NOTE — PROGRESS NOTES
Physical Therapy Daily Treatment Note     Name: Vasile Amato [facility] Number: [id number]    Therapy Diagnosis:   Encounter Diagnoses   Name Primary?    Pain aggravated by activities of daily living Yes    Decreased strength of upper extremity        Physician: Eddie Sheridan    Physician Orders: PT Eval and Treat  Medical Diagnosis from Referral: Superior glenoid labrum lesion of right shoulder, subsequent encounter [S43.431D], Calcific tendinitis of right shoulder region [M75.31], Chronic right shoulder pain [M25.511, G89.29]  Evaluation Date: 12/30/2022  Authorization Period Expiration: 12/30/23  Plan of Care Expiration: 3/30/23  Progress Note Due: 2/25/23  Visit # / Visits authorized: 8/20     Time In: 7:04 am  Time Out: 7:52 am  Total Billable Time: 48 minutes Billing reflects 1:1 direct supervision      Precautions: Standard    Subjective     Pt reports: Overall less pain in general. More strength. Still pain with going across his body.  He was compliant with home exercise program.  Response to previous treatment: Slightly improved symptoms  Functional change: No change yet    Pain: 3/10  Location: right Anterior shoulder     Objective     Primary Impairments: AGMR movement impairment syndrome    FOTO:  -Intake: 12/30 (33%)  -Status: incomplete  -Discharge: incomplete      Posture:  Pt presents with postural abnormalities which include:   B scapular abduction (5 fingers)  B scapular anterior tipping  Flat mid thoracic spine, increased upper thoracic kyphosis       Treatment     Vasile received the following manual therapy techniques: Joint mobilizations were applied to the: thoracic spine for 8 minutes, including:       Performed Today     Mobility assessment [x]  normal CTJ extension, T4-5 extension  Mild stiffness GH posterior/inferior mobility   Joint mobilization [x]  GH posterior/inferior mobs    Soft tissue mobilization []   Infraspinatus pin and stretch     []       Dry needling []   R  "infraspinatus      Dry needling was performed to muscles listed above to decrease inflammation, increase circulation, decrease pain and restore homeostasis. All needles were removed and changes in signs and symptoms were noted.  Patient tolerated treatment well without any adverse effects. Dry needling consent form was reviewed with the patient addressing all questions and concerns and signed by patient.  Copy of the consent form was provided to patient and copy of consent form scanned to patient Epic chart.    Plan for Next Visit:       THERAPEUTIC EXERCISES to develop strength, endurance, ROM, flexibility, posture, and core stabilization for (8) minutes including:     Range of motion assessment x 4 minutes    Intervention Performed Today     Pec minor, infraspinatus self STM   2 min    Side lying ER   2# 3 x burn    Side lying T    2# 3 x 10   Foam roller thoracic extension x  10x      Plan for Next Visit: See above        Vasile participated in neuromuscular re-education activities to improve: Balance, Coordination, Kinesthetic, Sense, and Proprioception for 30 minutes. The following activities were included:     Performed Today    Sahrmann middle trap x 15x   Sahrmann lower trap x 15x   Shoulder flexion with serratus activation x 15x   90/90 IR with scapula stable x 15x        Shoulder taps x 3 x 10 12" box   Ball on wall alphabet x 2 rounds to burn   Cable D2 flexion x 3 x 10 ea with 5#   Prone T x 3 x 8     Plan for Next Visit:        Home Exercises Provided and Patient Education Provided     Education provided:   - Physical therapy diagnosis, prognosis, and plan of care.   - Educated regarding upper quarter biomechanics and implications for rehab strategy    Written Home Exercises Provided: yes.  Exercises were reviewed and Vasile was able to demonstrate them prior to the end of the session.  Vasile demonstrated good  understanding of the education provided.     See EMR under Patient Instructions for exercises " provided 1/4/2023.    Assessment     Patient has improvements in function but continues to have low level symptoms during the day. He was noted to have continued motor control deficits consistent with shoulder anterior glide medial rotation syndrome. Improved trapezius and serratus activity with motor control exercises. Some anterior shoulder pain with exercises today.    Vasile Is progressing well towards his goals.   Pt prognosis is Excellent.     Pt will continue to benefit from skilled outpatient physical therapy to address the deficits listed in the problem list box on initial evaluation, provide pt/family education and to maximize pt's level of independence in the home and community environment.     Pt's spiritual, cultural and educational needs considered and pt agreeable to plan of care and goals.    Anticipated barriers to physical therapy: None    Goals:        Short Term Goals:  4 weeks Status  Date Met   PAIN: Pt will report worst pain of 4/10 in order to progress toward max functional ability and improve quality of life. [] Progressing  [x] Met  [] Not Met     FUNCTION: Patient will demonstrate improved function as indicated by a functional limitation score of less than or equal to 30 out of 100 on FOTO. [] Progressing  [x] Met  [] Not Met     MOBILITY: Patient will improve AROM to 50% of stated goals, listed in objective measures above, in order to progress towards independence with functional activities.  [x] Progressing  [] Met  [] Not Met     STRENGTH: Patient will improve strength to 50% of stated goals, listed in objective measures above, in order to progress towards independence with functional activities.  [x] Progressing  [] Met  [] Not Met     POSTURE: Patient will correct postural deviations in sitting and standing, to decrease pain and promote long term stability.  [] Progressing  [x] Met  [] Not Met     HEP: Patient will demonstrate independence with HEP in order to progress toward  functional independence. [] Progressing  [x] Met  [] Not Met        Long Term Goals:  8 weeks Status Date Met   PAIN: Pt will report worst pain of 2/10 in order to progress toward max functional ability and improve quality of life [x] Progressing  [] Met  [] Not Met     FUNCTION: Patient will demonstrate improved function as indicated by a functional limitation score of less than or equal to 24 out of 100 on FOTO. [x] Progressing  [] Met  [] Not Met     MOBILITY: Patient will improve AROM to stated goals, listed in objective measures above, in order to return to maximal functional potential and improve quality of life. [x] Progressing  [] Met  [] Not Met     STRENGTH: Patient will improve strength to stated goals, listed in objective measures above, in order to improve functional independence and quality of life. [x] Progressing  [] Met  [] Not Met     Patient will return to normal ADL's, IADL's, community involvement, recreational activities, and work-related activities with less than or equal to 2/10 pain and maximal function.  [x] Progressing  [] Met  [] Not Met          Plan       Continue to progress per patient tolerance.       Beto Rome, PT

## 2023-02-08 ENCOUNTER — CLINICAL SUPPORT (OUTPATIENT)
Dept: REHABILITATION | Facility: HOSPITAL | Age: 33
End: 2023-02-08
Payer: COMMERCIAL

## 2023-02-08 DIAGNOSIS — R52 PAIN AGGRAVATED BY ACTIVITIES OF DAILY LIVING: Primary | ICD-10-CM

## 2023-02-08 DIAGNOSIS — R29.898 DECREASED STRENGTH OF UPPER EXTREMITY: ICD-10-CM

## 2023-02-08 PROCEDURE — 97112 NEUROMUSCULAR REEDUCATION: CPT | Performed by: PHYSICAL THERAPIST

## 2023-02-08 PROCEDURE — 97110 THERAPEUTIC EXERCISES: CPT | Performed by: PHYSICAL THERAPIST

## 2023-02-08 NOTE — PROGRESS NOTES
Physical Therapy Daily Treatment Note     Name: Vasile Amato [facility] Number: [id number]    Therapy Diagnosis:   Encounter Diagnoses   Name Primary?    Pain aggravated by activities of daily living Yes    Decreased strength of upper extremity          Physician: Eddie Sheridan    Physician Orders: PT Eval and Treat  Medical Diagnosis from Referral: Superior glenoid labrum lesion of right shoulder, subsequent encounter [S43.431D], Calcific tendinitis of right shoulder region [M75.31], Chronic right shoulder pain [M25.511, G89.29]  Evaluation Date: 12/30/2022  Authorization Period Expiration: 12/30/23  Plan of Care Expiration: 3/30/23  Progress Note Due: 2/25/23  Visit # / Visits authorized: 9/20     Time In: 8:00 am  Time Out: 8:45 am  Total Billable Time: 45 minutes Billing reflects 1:1 direct supervision      Precautions: Standard    Subjective     Pt reports: Feeling pretty good. He would like to transition to an HEP for now until he follows up with Dr. La  He was compliant with home exercise program.  Response to previous treatment: Slightly improved symptoms  Functional change: No change yet    Pain: 3/10  Location: right Anterior shoulder     Objective     Primary Impairments: AGMR movement impairment syndrome    FOTO:  -Intake: 12/30 (33%)  -Status: incomplete  -Discharge: incomplete      Posture:  Pt presents with postural abnormalities which include:   B scapular abduction (5 fingers)  B scapular anterior tipping  Flat mid thoracic spine, increased upper thoracic kyphosis       Treatment     Vasile received the following manual therapy techniques: Joint mobilizations were applied to the: thoracic spine for 00 minutes, including:       Performed Today     Mobility assessment []  normal CTJ extension, T4-5 extension  Mild stiffness GH posterior/inferior mobility   Joint mobilization []  GH posterior/inferior mobs    Soft tissue mobilization []   Infraspinatus pin and stretch     []       Dry  "needling []   R infraspinatus      Dry needling was performed to muscles listed above to decrease inflammation, increase circulation, decrease pain and restore homeostasis. All needles were removed and changes in signs and symptoms were noted.  Patient tolerated treatment well without any adverse effects. Dry needling consent form was reviewed with the patient addressing all questions and concerns and signed by patient.  Copy of the consent form was provided to patient and copy of consent form scanned to patient Epic chart.    Plan for Next Visit:       THERAPEUTIC EXERCISES to develop strength, endurance, ROM, flexibility, posture, and core stabilization for (10) minutes including:     HEP instruction and discussion regarding HEP after Tenjet procedure    Intervention Performed Today     Pec minor, infraspinatus self STM   2 min    Side lying ER   2# 3 x burn    Side lying T    2# 3 x 10   Foam roller thoracic extension x  10x      Plan for Next Visit: See above        Vasile participated in neuromuscular re-education activities to improve: Balance, Coordination, Kinesthetic, Sense, and Proprioception for 30 minutes. The following activities were included:     Performed Today    Sahrmann middle trap x Level 3 10x   Sahrmann lower trap x 15x   Shoulder flexion with serratus activation x 15x   90/90 IR with scapula stable x 15x        Shoulder taps x 3 x 10 12" box   Ball on wall alphabet x 2 rounds to burn   Cable D2 flexion x 3 x 10 ea with 5#   Prone T x 3 x 8     Plan for Next Visit:        Home Exercises Provided and Patient Education Provided     Education provided:   - Physical therapy diagnosis, prognosis, and plan of care.   - Educated regarding upper quarter biomechanics and implications for rehab strategy    Written Home Exercises Provided: yes.  Exercises were reviewed and Vasile was able to demonstrate them prior to the end of the session.  Vasile demonstrated good  understanding of the education provided. "     See EMR under Patient Instructions for exercises provided 1/4/2023.    Assessment     Patient presents today with minimal symptoms. He has progress very well to this point, although he continues to have intermittent symptoms. He is planning to see Dr. La for calcific tendinitis TenJet procedure, and we will follow up at that time.    Vasile Is progressing well towards his goals.   Pt prognosis is Excellent.     Pt will continue to benefit from skilled outpatient physical therapy to address the deficits listed in the problem list box on initial evaluation, provide pt/family education and to maximize pt's level of independence in the home and community environment.     Pt's spiritual, cultural and educational needs considered and pt agreeable to plan of care and goals.    Anticipated barriers to physical therapy: None    Goals:        Short Term Goals:  4 weeks Status  Date Met   PAIN: Pt will report worst pain of 4/10 in order to progress toward max functional ability and improve quality of life. [] Progressing  [x] Met  [] Not Met     FUNCTION: Patient will demonstrate improved function as indicated by a functional limitation score of less than or equal to 30 out of 100 on FOTO. [] Progressing  [x] Met  [] Not Met     MOBILITY: Patient will improve AROM to 50% of stated goals, listed in objective measures above, in order to progress towards independence with functional activities.  [] Progressing  [x] Met  [] Not Met     STRENGTH: Patient will improve strength to 50% of stated goals, listed in objective measures above, in order to progress towards independence with functional activities.  [] Progressing  [x] Met  [] Not Met     POSTURE: Patient will correct postural deviations in sitting and standing, to decrease pain and promote long term stability.  [] Progressing  [x] Met  [] Not Met     HEP: Patient will demonstrate independence with HEP in order to progress toward functional independence. []  Progressing  [x] Met  [] Not Met        Long Term Goals:  8 weeks Status Date Met   PAIN: Pt will report worst pain of 2/10 in order to progress toward max functional ability and improve quality of life [x] Progressing  [] Met  [] Not Met     FUNCTION: Patient will demonstrate improved function as indicated by a functional limitation score of less than or equal to 24 out of 100 on FOTO. [x] Progressing  [] Met  [] Not Met     MOBILITY: Patient will improve AROM to stated goals, listed in objective measures above, in order to return to maximal functional potential and improve quality of life. [x] Progressing  [] Met  [] Not Met     STRENGTH: Patient will improve strength to stated goals, listed in objective measures above, in order to improve functional independence and quality of life. [x] Progressing  [] Met  [] Not Met     Patient will return to normal ADL's, IADL's, community involvement, recreational activities, and work-related activities with less than or equal to 2/10 pain and maximal function.  [x] Progressing  [] Met  [] Not Met          Plan       Follow up after TenJet procedure      Beto Rome, PT

## 2023-03-09 ENCOUNTER — PATIENT MESSAGE (OUTPATIENT)
Dept: SPORTS MEDICINE | Facility: CLINIC | Age: 33
End: 2023-03-09
Payer: COMMERCIAL

## 2023-04-12 ENCOUNTER — TELEPHONE (OUTPATIENT)
Dept: SPORTS MEDICINE | Facility: CLINIC | Age: 33
End: 2023-04-12
Payer: COMMERCIAL

## 2023-04-12 ENCOUNTER — PATIENT MESSAGE (OUTPATIENT)
Dept: SPORTS MEDICINE | Facility: CLINIC | Age: 33
End: 2023-04-12
Payer: COMMERCIAL

## 2023-04-14 ENCOUNTER — PROCEDURE VISIT (OUTPATIENT)
Dept: SPORTS MEDICINE | Facility: CLINIC | Age: 33
End: 2023-04-14
Payer: COMMERCIAL

## 2023-04-14 DIAGNOSIS — M75.31 CALCIFIC TENDINITIS OF RIGHT SHOULDER REGION: Primary | ICD-10-CM

## 2023-04-14 PROCEDURE — 99499 NO LOS: ICD-10-PCS | Mod: S$GLB,,, | Performed by: STUDENT IN AN ORGANIZED HEALTH CARE EDUCATION/TRAINING PROGRAM

## 2023-04-14 PROCEDURE — 99499 UNLISTED E&M SERVICE: CPT | Mod: S$GLB,,, | Performed by: STUDENT IN AN ORGANIZED HEALTH CARE EDUCATION/TRAINING PROGRAM

## 2023-04-14 PROCEDURE — 23405 PR INCISE TENDON/MUSCLE,SHLDR,SINGLE: ICD-10-PCS | Mod: RT,S$GLB,, | Performed by: STUDENT IN AN ORGANIZED HEALTH CARE EDUCATION/TRAINING PROGRAM

## 2023-04-14 PROCEDURE — 76942 ECHO GUIDE FOR BIOPSY: CPT | Mod: S$GLB,,, | Performed by: STUDENT IN AN ORGANIZED HEALTH CARE EDUCATION/TRAINING PROGRAM

## 2023-04-14 PROCEDURE — 23405 INCISION OF TENDON & MUSCLE: CPT | Mod: RT,S$GLB,, | Performed by: STUDENT IN AN ORGANIZED HEALTH CARE EDUCATION/TRAINING PROGRAM

## 2023-04-14 PROCEDURE — 76942 PR U/S GUIDANCE FOR NEEDLE GUIDANCE: ICD-10-PCS | Mod: S$GLB,,, | Performed by: STUDENT IN AN ORGANIZED HEALTH CARE EDUCATION/TRAINING PROGRAM

## 2023-04-14 NOTE — PATIENT INSTRUCTIONS
.Assessment:  Vasile Norris is a 33 y.o. male No chief complaint on file.      Encounter Diagnosis   Name Primary?    Calcific tendinitis of right shoulder region Yes        Plan:  Performed percutaneous tenotomy (TENJET) procedure in office today.   Provided proper education regarding in-procedure expectations and post-procedure expectations.   At least 15 minutes were spent sizing, fitting, and educating regarding durable medical equipment today and all questions answered. This service was performed by Susana Montes Sports Medicine Assistant under direction of Deric La MD today.  CPT 27600.  At least 15 minutes were spent developing, teaching, and performing a home exercise program.  A written summary was provided and all questions were answered. This service was performed by Susana Montes Sports Medicine Assistant under direction of Deric La MD. CPT 97460-SP  An external, ambulatory referral to physical therapy was placed today. Please reach out to them to schedule your initial consult and subsequent visits.         TENJET POST-PROCEDURE INSTRUCTIONS        1. WOUND CARE   - Keep bandages and procedure area clean and dry for 5 days   - Avoid submerging area in water for 5 days   - You did not have any sutures or stitches placed. Steri-strips were placed over the wound. These will fall off in the shower after about one week. If they have not fallen off after the first 9 days, you can remove them yourself.        CONTACT OUR OFFICE IF:     The area become red or hot to touch     You have a fever of 100° or more (but do not wait for a response, seek immediate care)     You have increased pain or swelling     You have drainage from the treatment site     Best way to connect would be via [employer] or call [facility] at [phone]. If unable to connect, please proceed to the nearest Emergency Care Center.       2. POST-PROCEDURE PAIN CONTROL   - You may apply heat for 20 minutes as needed for discomfort    - Pain control:  Tylenol (acetaminophen), hot pack application, Tramadol as needed   - Please refrain from using anti-inflammatory medication as this can react negatively with the goal of the procedure.        3. WEEK BY WEEK SCHEDULE     Weeks 1-2     Immobilization: Sling     Lifting restriction: Typically, no more then 2-3 lbs (can of peas) for the first 2-3 weeks     Activity/work limitations: No overuse/repetitive activity     Week 3-5     Therapy: Start at the beginning of week 3     Immobilization: none     Return to work/activity: Per guidance of Physical therapist     Week 6-8     Follow-up in the office with Dr. La     Week 7-12    Progress with your training or return to work     Typically, full results are noted at 3 months and beyond     Week 12     Follow-up with Dr. La             Follow-up: 6 weeks or sooner if there are any problems between now and then.    Thank you for choosing [facility] and Dr. Deric La for your orthopedic & sports medicine care. It is our goal to provide you with exceptional care that will help keep you healthy, active, and get you back in the game.    Please do not hesitate to reach out to us via email, phone, or MyChart with any questions, concerns, or feedback.    If you felt that you received exemplary care today, please consider leaving us feedback on Health[employer] at:  [email or web address]    If you are experiencing pain/discomfort ,or have questions after 5pm and would like to be connected to the [facility] on-call team, please call this number and specify which Sports Medicine provider is treating you: [phone]

## 2023-04-14 NOTE — PROCEDURES
TENJET Operative Note:    PATIENT NAME: Vasile Norris    MEDICAL RECORD NUMBER: [id number]    AGE: 33 y.o.    INFORMED CONSENT: I verify that I personally obtained Vasile Norris's consent which was signed and uploaded into [employer].     TIMEOUT: Audible timeout was performed at 8:21 a.m..   At this time, the team confirmed the correct patient, correct procedure and correct site with laterality. The patient's allergies were reviewed. The procedure site was marked. The procedure team checked for proper functioning of devices and supplies to be used for the procedure. The procedure team reviewed the relevant diagnostic tests pertinent to the procedure.  Confirmed by SMA Susana Montes and Luiz Lora.    PHYSICIAN: Deric La    LOCATION: [facility], [place].      PROCEDURE: TenJet Tenotomy procedure for right supraspinatus calcific tendinitis    ANESTHESIA: local    PREOPERATIVE DIAGNOSIS:  Right supraspinatus calcific tendinitis    POSTOPERATIVE DIAGNOSIS:  Right supraspinatus calcific tendinitis    PROCEDURE DESCRIPTION:    The treatment area was cleaned and draped in sterile fashion. Using sterile technique, a [employer] ultrasound laptop unit with a variable frequency (13.0-6.0 MHz) linear transducer was used to successfully localize the area of pathology to be treated today. A chris with a sterile marker was placed, on the skin, at the area of maximum tenderness. Then the treatment area was cleaned and draped in sterile fashion. A 22 gauge needle was visualized under ultrasound administering anesthetic lidocaine, to locally anesthetize the treatment area. A separate 25 gauge needle was then utilized to create a skin wheel using 1% lidocaine with epinephrine 1.5cm from the treatment area. An 11 gauge scalpel was used to make a small puncture incision in the area of the skin wheel, and ultrasound was utilized to visualize the scalpel at the target area. The  3 inch tenotomy needle was inserted into the pathologic tissue under direct ultrasound guidance, and tenotomy was performed with degenerative tendinotic tissue removed. Upon completion, gentle compression was applied to the site with sterile gauze. Adhesive strips, occlusive dressing, and compression bandage were then applied.    Patient left the procedure room in satisfactory condition having tolerated the procedure well.    SALINE AMOUNT: 2.5 L    ESTIMATED BLOOD LOSS: <5 ml    COMPLICATIONS: none    POSTOPERATIVE PLAN:   As indicated in the AVS given to the patient today post procedure.      Patient voiced understanding of these instructions.    Deric La

## 2023-04-17 ENCOUNTER — TELEPHONE (OUTPATIENT)
Dept: SPORTS MEDICINE | Facility: CLINIC | Age: 33
End: 2023-04-17
Payer: COMMERCIAL

## 2023-04-17 DIAGNOSIS — M75.31 CALCIFIC TENDINITIS OF RIGHT SHOULDER REGION: Primary | ICD-10-CM

## 2023-04-17 NOTE — TELEPHONE ENCOUNTER
Pt states that Friday and Saturday was rough with symptoms but this resolved as of Sunday. Is now having intermittent pain with certain movements. Overall doing much better. Will start PT at [facility] on [facility] 1st week of May. Order faxed and confirmed . Protocol confirmed with [facility] as well.

## 2023-04-20 ENCOUNTER — PATIENT MESSAGE (OUTPATIENT)
Dept: SPORTS MEDICINE | Facility: CLINIC | Age: 33
End: 2023-04-20
Payer: COMMERCIAL

## 2023-04-20 DIAGNOSIS — S43.431D SUPERIOR GLENOID LABRUM LESION OF RIGHT SHOULDER, SUBSEQUENT ENCOUNTER: ICD-10-CM

## 2023-04-20 DIAGNOSIS — M75.31 CALCIFIC TENDINITIS OF RIGHT SHOULDER REGION: Primary | ICD-10-CM

## 2023-04-20 RX ORDER — TRAMADOL HYDROCHLORIDE 50 MG/1
50 TABLET ORAL EVERY 12 HOURS PRN
Qty: 14 EACH | Refills: 0 | Status: SHIPPED | OUTPATIENT
Start: 2023-04-20 | End: 2023-12-26

## 2023-04-20 RX ORDER — ONDANSETRON 4 MG/1
4 TABLET, ORALLY DISINTEGRATING ORAL EVERY 12 HOURS PRN
Qty: 14 TABLET | Refills: 0 | Status: SHIPPED | OUTPATIENT
Start: 2023-04-20

## 2023-05-26 ENCOUNTER — OFFICE VISIT (OUTPATIENT)
Dept: SPORTS MEDICINE | Facility: CLINIC | Age: 33
End: 2023-05-26
Payer: COMMERCIAL

## 2023-05-26 VITALS — BODY MASS INDEX: 27.83 KG/M2 | WEIGHT: 210 LBS | HEIGHT: 73 IN | RESPIRATION RATE: 18 BRPM

## 2023-05-26 DIAGNOSIS — M75.31 CALCIFIC TENDINITIS OF RIGHT SHOULDER REGION: Primary | ICD-10-CM

## 2023-05-26 PROCEDURE — 99499 UNLISTED E&M SERVICE: CPT | Mod: S$GLB,,, | Performed by: STUDENT IN AN ORGANIZED HEALTH CARE EDUCATION/TRAINING PROGRAM

## 2023-05-26 PROCEDURE — 99999 PR PBB SHADOW E&M-EST. PATIENT-LVL III: CPT | Mod: PBBFAC,,, | Performed by: STUDENT IN AN ORGANIZED HEALTH CARE EDUCATION/TRAINING PROGRAM

## 2023-05-26 PROCEDURE — 99999 PR PBB SHADOW E&M-EST. PATIENT-LVL III: ICD-10-PCS | Mod: PBBFAC,,, | Performed by: STUDENT IN AN ORGANIZED HEALTH CARE EDUCATION/TRAINING PROGRAM

## 2023-05-26 PROCEDURE — 99499 NO LOS: ICD-10-PCS | Mod: S$GLB,,, | Performed by: STUDENT IN AN ORGANIZED HEALTH CARE EDUCATION/TRAINING PROGRAM

## 2023-05-26 NOTE — PROGRESS NOTES
Patient ID: Vasile Norris  YOB: 1990  MRN: [id number]    Chief Complaint: Post-op Evaluation (Calcific tendinitis of right shoulder 4/14/23 )    History of Present Illness: Vasile Norris is a right-hand dominant 33 y.o. male who is here for f/u evaluation of 0/10 pain Post-op #1Calcific tendinitis of right shoulder 4/14/23.     The patient is active in none.  Occupation: [employer]   Last Appointment: 4/14/23  Diagnosis: Calcific tendinitis of right shoulder   Prior Procedure: Tenjet (4/13/23), Physical Therapy   PT Location: [facility]     The patient returns today reporting that the symptoms at rest have resolved but does note ROM limitation above head and behind back and subtle weakness. He is in PT at [facility] and states this is going well. Denies any ache or burn at shoulder at rest or with movement. To review his history, he was referred  from Eddie Sheridan MD back on 1/06/23 for chronic right shoulder pain with a MR-arthrogram of right shoulder revealing a SLAP tear and calcific tendinitis. Aggravating activity included: overhead movements, cross body active and passive and reaching behind back. Endorsed ache and burn at anterior shoulder near rotator cuff interval. Had a subacromial corticosteroid injection in October 2022 that completely resolved his pain. Pain had been present for 3 years status post pushing himself out of bed.     Past Medical History:   Past Medical History:   Diagnosis Date    Allergy     Hypertension     Kidney stones     Migraines      Past Surgical History:   Procedure Laterality Date    kidney stone removal      LITHOTRIPSY Right     nail avulsion      PILONIDAL CYST DRAINAGE      TONSILLECTOMY      WISDOM TOOTH EXTRACTION       Family History   Problem Relation Age of Onset    Hypertension Father     Prostate cancer Father     Cancer Father         Prostate cancer    Seizures Mother     Colon cancer Neg Hx      Social  History     Socioeconomic History    Marital status:    Tobacco Use    Smoking status: Never     Passive exposure: Never    Smokeless tobacco: Never   Substance and Sexual Activity    Alcohol use: Yes     Alcohol/week: 1.0 standard drink     Types: 1 Cans of beer per week     Comment: occasionally    Drug use: No    Sexual activity: Yes     Partners: Female     Birth control/protection: None     Social Determinants of Health     Financial Resource Strain: Low Risk     Difficulty of Paying Living Expenses: Not hard at all   Food Insecurity: No Food Insecurity    Worried About Running Out of Food in the Last Year: Never true    Ran Out of Food in the Last Year: Never true   Transportation Needs: No Transportation Needs    Lack of Transportation (Medical): No    Lack of Transportation (Non-Medical): No   Physical Activity: Sufficiently Active    Days of Exercise per Week: 6 days    Minutes of Exercise per Session: 150+ min   Stress: No Stress Concern Present    Feeling of Stress : Not at all   Social Connections: Unknown    Frequency of Communication with Friends and Family: More than three times a week    Frequency of Social Gatherings with Friends and Family: Once a week    Active Member of Clubs or Organizations: Yes    Attends Club or Organization Meetings: More than 4 times per year    Marital Status:    Housing Stability: Low Risk     Unable to Pay for Housing in the Last Year: No    Number of Places Lived in the Last Year: 1    Unstable Housing in the Last Year: No     Medication List with Changes/Refills   Current Medications    AMLODIPINE-OLMESARTAN (ANUP) 5-20 MG PER TABLET    Take 1 tablet by mouth once daily.    BOTOX 200 UNIT SOLR        ONDANSETRON (ZOFRAN) 4 MG TABLET    TAKE ONE BY MOUTH EVERY 8 HOURS AS NEEDED, FOR NAUSEA    ONDANSETRON (ZOFRAN-ODT) 4 MG TBDL    Take 1 tablet (4 mg total) by mouth every 12 (twelve) hours as needed.    POTASSIUM CITRATE (UROCIT-K) 10 MEQ (1,080 MG) TBSR     Take 1 tablet by mouth once daily.    PRAMOXINE 1 % FOAM    Place rectally 3 (three) times daily as needed.    TRAMADOL (ULTRAM) 50 MG TABLET    Take 1 tablet (50 mg total) by mouth every 12 (twelve) hours as needed for Pain.     Review of patient's allergies indicates:   Allergen Reactions    Codeine Hives    Nortriptyline Palpitations    Topiramate Other (See Comments)       Physical Exam:   Body mass index is 27.71 kg/m².    GENERAL: Well appearing, in no acute distress.  HEAD: Normocephalic and atraumatic.  ENT: External ears and nose grossly normal.  EYES: EOMI bilaterally  PULMONARY: Respirations are grossly even and non-labored.  NEURO: Awake, alert, and oriented x 3.  SKIN: No obvious rashes appreciated.  PSYCH: Mood & affect are appropriate.    Detailed MSK exam:   Pain at end range shoulder ROM-- flexion at 170 actively, abduction to 120. Positive impingement sign. Neer's positive at terminal end range. Negative painful arc. Empty can pain. Negative speeds.     Imaging:  MRI Arthrogram Shoulder With Contrast Right  Narrative: EXAMINATION:  MRI ARTHROGRAM SHOULDER WITH CONTRAST RIGHT    CLINICAL HISTORY:  Shoulder pain, rotator cuff disorder suspected, xray done;Shoulder pain, labral tear suspected, xray done;  Pain in right shoulder    TECHNIQUE:  Multiplanar/multisequence MRI of the right shoulder was performed after intra-articular dilute 0.1 mL Gadavist.    COMPARISON:  None    FINDINGS:  Rotator cuff: Supraspinatus, infraspinatus, subscapularis, and teres minor tendons are intact.  Rotator cuff muscle bulk is preserved.    Labrum: Irregularity of the superior and anterior superior labrum favored represent labral tear versus less likely sublabral foramen    Long head of the biceps: Intact    Bones: Marrow signal is within normal limits with the exception of mild cystic change underlying the rotator cuff footprint.  No evidence of fracture or marrow replacement process.    AC joint: Within normal  "limits.    Cartilage: No large glenohumeral articular cartilage defect demonstrated on this non arthrographic study.  Impression: Irregularity of the superior and anterior superior labrum favored represent labral tear versus less likely sublabral foramen    Electronically signed by: Delonte Tong  Date:    12/29/2022  Time:    18:53  X-Ray Arthrogram Shoulder Right, COMPLETE (XPD)  Narrative: EXAMINATION:  XR ARTHROGRAM SHOULDER RIGHT, COMPLETE (XPD)    CLINICAL HISTORY:  Calcific tendinitis of right shoulder    TECHNIQUE:  Following written informed consent and discussion of applicable risks and benefits the patient was placed in the supine position on the examination table.  Using sterile technique and 1% lidocaine for local anesthesia a 22-gauge 3.5 "needle was advanced into the right glenohumeral joint under fluoroscopic guidance.  Subsequently, 15 cc of a 1:200 solution of gadolinium in normal saline and iodinated contrast material was instilled into the right glenohumeral joint.  The procedure was tolerated well with no immediate complications.    COMPARISON:  None    FINDINGS:  Single spot image obtained and demonstrates normal contrast opacification of the right glenohumeral joint.    Total fluoroscopy time: 0.6 minutes  Impression: Successful and uneventful right shoulder injection for MR arthrography.    Electronically signed by: Pravin Knowles MD  Date:    12/29/2022  Time:    15:01    Relevant imaging results were reviewed and interpreted by me and per my read of.  This was discussed with the patient and / or family today.     Assessment:  Vasile Norris is a 33 y.o. male presents today 6 weeks status post TENJET right calcific shoulder.  Chronic pain gone no pain at rest very happy with the improvements.  Most pain at end range in some weakness in the supraspinatus plane.  Continue with PT at [facility] follow-up in 6 weeks.  Consider subacromial injection is having some painless popping at " times different than the pain he had in the past.    Calcific tendinitis of right shoulder region           Deric La MD    Disclaimer: This note was prepared using a voice recognition system and is likely to have sound alike errors within the text.

## 2023-05-26 NOTE — PROGRESS NOTES
Patient ID: Vasile Norris  YOB: 1990  MRN: [id number]    Chief Complaint: Post-op Evaluation (Calcific tendinitis of right shoulder 4/14/23 )      History of Present Illness: Vasile Norris is a right-hand dominant 33 y.o. male who is here for f/u evaluation of 0/10 pain Post-op #1Calcific tendinitis of right shoulder 4/14/23.     The patient is active in none.  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     To review his history, ***    ***    Past Medical History:   Past Medical History:   Diagnosis Date    Allergy     Hypertension     Kidney stones     Migraines      Past Surgical History:   Procedure Laterality Date    kidney stone removal      LITHOTRIPSY Right     nail avulsion      PILONIDAL CYST DRAINAGE      TONSILLECTOMY      WISDOM TOOTH EXTRACTION       Family History   Problem Relation Age of Onset    Hypertension Father     Prostate cancer Father     Cancer Father         Prostate cancer    Seizures Mother     Colon cancer Neg Hx      Social History     Socioeconomic History    Marital status:    Tobacco Use    Smoking status: Never     Passive exposure: Never    Smokeless tobacco: Never   Substance and Sexual Activity    Alcohol use: Yes     Alcohol/week: 1.0 standard drink     Types: 1 Cans of beer per week     Comment: occasionally    Drug use: No    Sexual activity: Yes     Partners: Female     Birth control/protection: None     Social Determinants of Health     Financial Resource Strain: Low Risk     Difficulty of Paying Living Expenses: Not hard at all   Food Insecurity: No Food Insecurity    Worried About Running Out of Food in the Last Year: Never true    Ran Out of Food in the Last Year: Never true    Transportation Needs: No Transportation Needs    Lack of Transportation (Medical): No    Lack of Transportation (Non-Medical): No   Physical Activity: Sufficiently Active    Days of Exercise per Week: 6 days    Minutes of Exercise per Session: 150+ min   Stress: No Stress Concern Present    Feeling of Stress : Not at all   Social Connections: Unknown    Frequency of Communication with Friends and Family: More than three times a week    Frequency of Social Gatherings with Friends and Family: Once a week    Active Member of Clubs or Organizations: Yes    Attends Club or Organization Meetings: More than 4 times per year    Marital Status:    Housing Stability: Low Risk     Unable to Pay for Housing in the Last Year: No    Number of Places Lived in the Last Year: 1    Unstable Housing in the Last Year: No     Medication List with Changes/Refills   Current Medications    AMLODIPINE-OLMESARTAN (ANUP) 5-20 MG PER TABLET    Take 1 tablet by mouth once daily.    BOTOX 200 UNIT SOLR        ONDANSETRON (ZOFRAN) 4 MG TABLET    TAKE ONE BY MOUTH EVERY 8 HOURS AS NEEDED, FOR NAUSEA    ONDANSETRON (ZOFRAN-ODT) 4 MG TBDL    Take 1 tablet (4 mg total) by mouth every 12 (twelve) hours as needed.    POTASSIUM CITRATE (UROCIT-K) 10 MEQ (1,080 MG) TBSR    Take 1 tablet by mouth once daily.    PRAMOXINE 1 % FOAM    Place rectally 3 (three) times daily as needed.    TRAMADOL (ULTRAM) 50 MG TABLET    Take 1 tablet (50 mg total) by mouth every 12 (twelve) hours as needed for Pain.     Review of patient's allergies indicates:   Allergen Reactions    Codeine Hives    Nortriptyline Palpitations    Topiramate Other (See Comments)       Physical Exam:   Body mass index is 27.71 kg/m².    GENERAL: Well appearing, in no acute distress.  HEAD: Normocephalic and atraumatic.  ENT: External ears and nose grossly normal.  EYES: EOMI bilaterally  PULMONARY: Respirations are grossly even and non-labored.  NEURO: Awake, alert, and oriented x  "3.  SKIN: No obvious rashes appreciated.  PSYCH: Mood & affect are appropriate.    Detailed MSK exam:     ***    Imaging:  MRI Arthrogram Shoulder With Contrast Right  Narrative: EXAMINATION:  MRI ARTHROGRAM SHOULDER WITH CONTRAST RIGHT    CLINICAL HISTORY:  Shoulder pain, rotator cuff disorder suspected, xray done;Shoulder pain, labral tear suspected, xray done;  Pain in right shoulder    TECHNIQUE:  Multiplanar/multisequence MRI of the right shoulder was performed after intra-articular dilute 0.1 mL Gadavist.    COMPARISON:  None    FINDINGS:  Rotator cuff: Supraspinatus, infraspinatus, subscapularis, and teres minor tendons are intact.  Rotator cuff muscle bulk is preserved.    Labrum: Irregularity of the superior and anterior superior labrum favored represent labral tear versus less likely sublabral foramen    Long head of the biceps: Intact    Bones: Marrow signal is within normal limits with the exception of mild cystic change underlying the rotator cuff footprint.  No evidence of fracture or marrow replacement process.    AC joint: Within normal limits.    Cartilage: No large glenohumeral articular cartilage defect demonstrated on this non arthrographic study.  Impression: Irregularity of the superior and anterior superior labrum favored represent labral tear versus less likely sublabral foramen    Electronically signed by: Delonte Tong  Date:    12/29/2022  Time:    18:53  X-Ray Arthrogram Shoulder Right, COMPLETE (XPD)  Narrative: EXAMINATION:  XR ARTHROGRAM SHOULDER RIGHT, COMPLETE (XPD)    CLINICAL HISTORY:  Calcific tendinitis of right shoulder    TECHNIQUE:  Following written informed consent and discussion of applicable risks and benefits the patient was placed in the supine position on the examination table.  Using sterile technique and 1% lidocaine for local anesthesia a 22-gauge 3.5 "needle was advanced into the right glenohumeral joint under fluoroscopic guidance.  Subsequently, 15 cc of a 1:200 " solution of gadolinium in normal saline and iodinated contrast material was instilled into the right glenohumeral joint.  The procedure was tolerated well with no immediate complications.    COMPARISON:  None    FINDINGS:  Single spot image obtained and demonstrates normal contrast opacification of the right glenohumeral joint.    Total fluoroscopy time: 0.6 minutes  Impression: Successful and uneventful right shoulder injection for MR arthrography.    Electronically signed by: Pravin Knowles MD  Date:    12/29/2022  Time:    15:01    ***    Relevant imaging results were reviewed and interpreted by me and per my read ***.  This was discussed with the patient and / or family today.     Assessment:  Vasile Norris is a 33 y.o. male ***    There are no diagnoses linked to this encounter.       Deric La MD    Disclaimer: This note was prepared using a voice recognition system and is likely to have sound alike errors within the text.

## 2023-07-14 ENCOUNTER — OFFICE VISIT (OUTPATIENT)
Dept: SPORTS MEDICINE | Facility: CLINIC | Age: 33
End: 2023-07-14
Payer: COMMERCIAL

## 2023-07-14 VITALS — BODY MASS INDEX: 27.83 KG/M2 | HEIGHT: 73 IN | WEIGHT: 210 LBS

## 2023-07-14 DIAGNOSIS — M75.31 CALCIFIC TENDINITIS OF RIGHT SHOULDER REGION: Primary | ICD-10-CM

## 2023-07-14 PROCEDURE — 99213 PR OFFICE/OUTPT VISIT, EST, LEVL III, 20-29 MIN: ICD-10-PCS | Mod: S$GLB,,, | Performed by: STUDENT IN AN ORGANIZED HEALTH CARE EDUCATION/TRAINING PROGRAM

## 2023-07-14 PROCEDURE — 99999 PR PBB SHADOW E&M-EST. PATIENT-LVL III: CPT | Mod: PBBFAC,,, | Performed by: STUDENT IN AN ORGANIZED HEALTH CARE EDUCATION/TRAINING PROGRAM

## 2023-07-14 PROCEDURE — 99213 OFFICE O/P EST LOW 20 MIN: CPT | Mod: S$GLB,,, | Performed by: STUDENT IN AN ORGANIZED HEALTH CARE EDUCATION/TRAINING PROGRAM

## 2023-07-14 PROCEDURE — 99999 PR PBB SHADOW E&M-EST. PATIENT-LVL III: ICD-10-PCS | Mod: PBBFAC,,, | Performed by: STUDENT IN AN ORGANIZED HEALTH CARE EDUCATION/TRAINING PROGRAM

## 2023-07-14 NOTE — PROGRESS NOTES
Patient ID: Vasile Norris  YOB: 1990  MRN: [id number]    Chief Complaint: Post-op Evaluation of the Right Shoulder    History of Present Illness: Vasile Norris is a right-hand dominant 33 y.o. male who is here for PO 2 evaluation of  right calcific tendinitis.     Last Appointment: 5/26/23  Diagnosis: Calcific tendinitis of right shoulder   Procedure Date:  4/14/23  PT Location: [facility]- no longer in therapy    The patient returns today reporting that the symptoms have improved greatly and notices  low level of pain intermittently.  They have not had to use pain medications. They have not had complications. He has graduated therapy and reports 90% of improvement today. Endorses cross body adduction and extreme end ranges of motion as most provocative. Feels that his strength has normalized.     To review his history, referred by Eddie Sheridan MD initially. He had previously been seen by him with an extensive workup on chronic right shoulder pain with a MR-arthrogram of right shoulder revealing a SLAP tear and calcific tendinitis. He stated that overhead movements, cross body active and passive and reaching behind back aggravates symptoms the most. Endorsed ache and burn at anterior shoulder near rotator cuff interval. He has had a subacromial corticosteroid injection in October 2022 that completely resolved his pain. Pain had been present for 3 years status post pushing himself out of bed. He is looking for some sort of pain relief and to push off invasive surgery at this time.        Past Medical History:   Past Medical History:   Diagnosis Date    Allergy     Hypertension     Kidney stones     Migraines      Past Surgical History:   Procedure Laterality Date    kidney stone removal      LITHOTRIPSY Right     nail avulsion      PILONIDAL CYST DRAINAGE      TONSILLECTOMY      WISDOM TOOTH EXTRACTION       Family History   Problem Relation Age of Onset    Hypertension  Father     Prostate cancer Father     Cancer Father         Prostate cancer    Seizures Mother     Colon cancer Neg Hx      Social History     Socioeconomic History    Marital status:    Tobacco Use    Smoking status: Never     Passive exposure: Never    Smokeless tobacco: Never   Substance and Sexual Activity    Alcohol use: Yes     Alcohol/week: 1.0 standard drink     Types: 1 Cans of beer per week     Comment: occasionally    Drug use: No    Sexual activity: Yes     Partners: Female     Birth control/protection: None     Social Determinants of Health     Financial Resource Strain: Low Risk     Difficulty of Paying Living Expenses: Not hard at all   Food Insecurity: No Food Insecurity    Worried About Running Out of Food in the Last Year: Never true    Ran Out of Food in the Last Year: Never true   Transportation Needs: No Transportation Needs    Lack of Transportation (Medical): No    Lack of Transportation (Non-Medical): No   Physical Activity: Sufficiently Active    Days of Exercise per Week: 6 days    Minutes of Exercise per Session: 150+ min   Stress: No Stress Concern Present    Feeling of Stress : Not at all   Social Connections: Unknown    Frequency of Communication with Friends and Family: More than three times a week    Frequency of Social Gatherings with Friends and Family: Once a week    Active Member of Clubs or Organizations: Yes    Attends Club or Organization Meetings: More than 4 times per year    Marital Status:    Housing Stability: Low Risk     Unable to Pay for Housing in the Last Year: No    Number of Places Lived in the Last Year: 1    Unstable Housing in the Last Year: No     Medication List with Changes/Refills   Current Medications    AMLODIPINE-OLMESARTAN (ANUP) 5-20 MG PER TABLET    Take 1 tablet by mouth once daily.    BOTOX 200 UNIT SOLR        ONDANSETRON (ZOFRAN) 4 MG TABLET    TAKE ONE BY MOUTH EVERY 8 HOURS AS NEEDED, FOR NAUSEA    ONDANSETRON (ZOFRAN-ODT) 4 MG TBDL     Take 1 tablet (4 mg total) by mouth every 12 (twelve) hours as needed.    POTASSIUM CITRATE (UROCIT-K) 10 MEQ (1,080 MG) TBSR    Take 1 tablet by mouth once daily.    PRAMOXINE 1 % FOAM    Place rectally 3 (three) times daily as needed.    TRAMADOL (ULTRAM) 50 MG TABLET    Take 1 tablet (50 mg total) by mouth every 12 (twelve) hours as needed for Pain.     Review of patient's allergies indicates:   Allergen Reactions    Codeine Hives    Nortriptyline Palpitations    Topiramate Other (See Comments)       Physical Exam:   Body mass index is 27.71 kg/m².    GENERAL: Well appearing, in no acute distress.  HEAD: Normocephalic and atraumatic.  ENT: External ears and nose grossly normal.  EYES: EOMI bilaterally  PULMONARY: Respirations are grossly even and non-labored.  NEURO: Awake, alert, and oriented x 3.  SKIN: No obvious rashes appreciated.  PSYCH: Mood & affect are appropriate.    Detailed MSK exam:     ROM-  Full and symmetrical ROM at right shoulder   Resisted strength-   Flexion 5/5, ER 5/5, IR 5/5, extension 5/5    Tenderness to palpation: AC negative, long head biceps tendon negative, anterior capsule negative, posterior capsule negative, biceps negative, rotator cuff negative    Neers negative, Roque Dario in scaption negative, Roque Dario in cross body negative, Cross-body adduction positive, resisted extension negative, Obriens negative, Speeds negative, Empty can (resisted scaption) negative, Full can (resisted scaption) negative, resisted abduction negative, Belly press negative, lift-off negative, resisted ER above 90 positive pain      Imaging:  MRI Arthrogram Shoulder With Contrast Right  Narrative: EXAMINATION:  MRI ARTHROGRAM SHOULDER WITH CONTRAST RIGHT    CLINICAL HISTORY:  Shoulder pain, rotator cuff disorder suspected, xray done;Shoulder pain, labral tear suspected, xray done;  Pain in right shoulder    TECHNIQUE:  Multiplanar/multisequence MRI of the right shoulder was performed after  "intra-articular dilute 0.1 mL Gadavist.    COMPARISON:  None    FINDINGS:  Rotator cuff: Supraspinatus, infraspinatus, subscapularis, and teres minor tendons are intact.  Rotator cuff muscle bulk is preserved.    Labrum: Irregularity of the superior and anterior superior labrum favored represent labral tear versus less likely sublabral foramen    Long head of the biceps: Intact    Bones: Marrow signal is within normal limits with the exception of mild cystic change underlying the rotator cuff footprint.  No evidence of fracture or marrow replacement process.    AC joint: Within normal limits.    Cartilage: No large glenohumeral articular cartilage defect demonstrated on this non arthrographic study.  Impression: Irregularity of the superior and anterior superior labrum favored represent labral tear versus less likely sublabral foramen    Electronically signed by: Delonte Tong  Date:    12/29/2022  Time:    18:53  X-Ray Arthrogram Shoulder Right, COMPLETE (XPD)  Narrative: EXAMINATION:  XR ARTHROGRAM SHOULDER RIGHT, COMPLETE (XPD)    CLINICAL HISTORY:  Calcific tendinitis of right shoulder    TECHNIQUE:  Following written informed consent and discussion of applicable risks and benefits the patient was placed in the supine position on the examination table.  Using sterile technique and 1% lidocaine for local anesthesia a 22-gauge 3.5 "needle was advanced into the right glenohumeral joint under fluoroscopic guidance.  Subsequently, 15 cc of a 1:200 solution of gadolinium in normal saline and iodinated contrast material was instilled into the right glenohumeral joint.  The procedure was tolerated well with no immediate complications.    COMPARISON:  None    FINDINGS:  Single spot image obtained and demonstrates normal contrast opacification of the right glenohumeral joint.    Total fluoroscopy time: 0.6 minutes  Impression: Successful and uneventful right shoulder injection for MR arthrography.    Electronically signed " by: Pravin Knowles MD  Date:    12/29/2022  Time:    15:01    Relevant imaging results were reviewed and interpreted by me and per my read as above.  This was discussed with the patient and / or family today.     Assessment:  Vasile Norris is a 33 y.o. male presents today 3 months status post TENJET to the right shoulder.  Doing well still having a little bit end range of motion tightness and pain especially with cross arm overhead.  But overall constant pain from previous prior to the procedure is doing much better.  He is overall very happy with the results of his procedure and will continue home exercises and return to daily activity as needed.  Follow-up with me as needed in the future.    Calcific tendinitis of right shoulder region           Deric La MD    Disclaimer: This note was prepared using a voice recognition system and is likely to have sound alike errors within the text.

## 2023-09-27 NOTE — PROGRESS NOTES
CHIEF COMPLAINT  Bilateral knee pain    SUBJECTIVE  58-year-old patient returns to the office for reevaluation of his l lateral knees.  He was seen in the office last year and underwent a cortisone injection for treatment of his left knee pain.  Subjectively, now having pain although the injection worked quite well for a long period of time.      Visit accomplished with video translation line.    MEDICATIONS  Medications were reviewed and updated today.    HISTORIES  I have personally reviewed and updated the following EMR sections: Current medications, Allergies, Problem list, Past Medical History, Past Surgical History, Social History and Family History.  Please reference intake form.    REVIEW OF SYSTEMS  All other systems are reviewed and are negative except as documented in the HPI (History of Present Illness)    PHYSICAL EXAM  There were no vitals taken for this visit.   Constitutional:  Well developed, well nourished male in no acute distress.  Skin: Warm, dry, intact without rash or lesion.  Neurologic:  Alert & oriented x 3,  Normal gait.    Psychiatric:  Speech and behavior appropriate.  Musculoskeletal: Both knees are without effusions and are stable to ligamentous testing with motion from 0-1 20.  Left knee is tender to palpation over the medial joint space . Neurovascularly intact      ASSESSMENT/PLAN  Osteoarthritis bilateral knees    Today, discussed with patient management of an arthritic knee.  We discussed that periodic cortisone injections and viscosupplementation is an option for managing his pain.      At his request, left knee was injected with cortisone.  He will return in 1 week for injection of the right knee.  Given his history of diabetes, elected not to inject both knees today.     All questions answered.  Patient voiced understanding.  Plan will be to follow-up in the office in 1 week.    L Inj/Asp: L knee on 9/27/2023 4:43 PM  Indications: pain  Details: 22 G needle, anterolateral  Subjective:   Patient ID: Vasile Norris is a 32 y.o. male.  Chief Complaint:  Annual Exam    HPI    Current Outpatient Medications:     BOTOX 200 unit SolR, , Disp: , Rfl:     ondansetron (ZOFRAN) 4 MG tablet, TAKE ONE BY MOUTH EVERY 8 HOURS AS NEEDED, FOR NAUSEA, Disp: , Rfl:     potassium citrate (UROCIT-K) 10 mEq (1,080 mg) TbSR, Take 1 tablet by mouth once daily., Disp: , Rfl:     pramoxine 1 % Foam, Place rectally 3 (three) times daily as needed., Disp: 15 g, Rfl: 1    amlodipine-olmesartan (ANUP) 5-20 mg per tablet, Take 1 tablet by mouth once daily., Disp: 90 tablet, Rfl: 3    Review of Systems    Objective:   /86 (BP Location: Left arm, Patient Position: Sitting, BP Method: Large (Manual))   Pulse 82   Temp 97.9 °F (36.6 °C) (Tympanic)   Ht 6' (1.829 m)   Wt 100.9 kg (222 lb 7.1 oz)   SpO2 98%   BMI 30.17 kg/m²     Physical Exam    Assessment:       ICD-10-CM ICD-9-CM   1. Annual physical exam  Z00.00 V70.0   2. Essential hypertension  I10 401.9   3. Non-seasonal allergic rhinitis, unspecified trigger  J30.89 477.8   4. Pain aggravated by activities of daily living  R52 780.96     Plan:   Annual physical exam    Essential hypertension  -     amlodipine-olmesartan (ANUP) 5-20 mg per tablet; Take 1 tablet by mouth once daily.  Dispense: 90 tablet; Refill: 3    Non-seasonal allergic rhinitis, unspecified trigger    Pain aggravated by activities of daily living         approach  Medications: 6 mg betamethasone acet/sod phos 6 (3-3) MG/ML  Procedure, treatment alternatives, risks and benefits explained, specific risks discussed. Immediately prior to procedure a time out was called to verify the correct patient, procedure, equipment, support staff and site/side marked as required. Patient was prepped and draped in the usual sterile fashion.             Mari MCCOY PA-C, saw this pt and recorded the documentation as scribe.  It reflects the service personally performed and decisions made by Jeet Xie M.D.     The documentation recorded by the scribe, Mari Ruby, accurately reflects the service I personally performed and the decision made by me, Dr. Arvind Xie.

## 2023-10-02 ENCOUNTER — PATIENT MESSAGE (OUTPATIENT)
Dept: INTERNAL MEDICINE | Facility: CLINIC | Age: 33
End: 2023-10-02
Payer: COMMERCIAL

## 2023-11-26 DIAGNOSIS — L29.0 PRURITUS ANI: ICD-10-CM

## 2023-11-27 RX ORDER — PRAMOXINE HYDROCHLORIDE 10 MG/G
AEROSOL, FOAM TOPICAL 3 TIMES DAILY PRN
Qty: 15 G | Refills: 3 | Status: SHIPPED | OUTPATIENT
Start: 2023-11-27

## 2023-12-23 ENCOUNTER — PATIENT MESSAGE (OUTPATIENT)
Dept: INTERNAL MEDICINE | Facility: CLINIC | Age: 33
End: 2023-12-23
Payer: COMMERCIAL

## 2023-12-23 DIAGNOSIS — B35.6 TINEA CRURIS: ICD-10-CM

## 2023-12-26 RX ORDER — CLOTRIMAZOLE AND BETAMETHASONE DIPROPIONATE 10; .64 MG/G; MG/G
CREAM TOPICAL 2 TIMES DAILY
Qty: 45 G | Refills: 0 | Status: SHIPPED | OUTPATIENT
Start: 2023-12-26 | End: 2024-03-05

## 2024-01-08 ENCOUNTER — TELEPHONE (OUTPATIENT)
Dept: INTERNAL MEDICINE | Facility: CLINIC | Age: 34
End: 2024-01-08
Payer: COMMERCIAL

## 2024-01-08 NOTE — TELEPHONE ENCOUNTER
Attempt to contact pt regarding rescheduling appt; PCP will be out of office; MA lvm to reschedule /LD

## 2024-02-14 DIAGNOSIS — I10 ESSENTIAL HYPERTENSION: ICD-10-CM

## 2024-03-05 ENCOUNTER — OFFICE VISIT (OUTPATIENT)
Dept: INTERNAL MEDICINE | Facility: CLINIC | Age: 34
End: 2024-03-05
Payer: COMMERCIAL

## 2024-03-05 VITALS
HEIGHT: 73 IN | BODY MASS INDEX: 32.75 KG/M2 | HEART RATE: 91 BPM | OXYGEN SATURATION: 98 % | WEIGHT: 247.13 LBS | SYSTOLIC BLOOD PRESSURE: 124 MMHG | DIASTOLIC BLOOD PRESSURE: 84 MMHG

## 2024-03-05 DIAGNOSIS — G43.009 MIGRAINE WITHOUT AURA AND WITHOUT STATUS MIGRAINOSUS, NOT INTRACTABLE: ICD-10-CM

## 2024-03-05 DIAGNOSIS — Z00.00 ANNUAL PHYSICAL EXAM: Primary | ICD-10-CM

## 2024-03-05 DIAGNOSIS — I10 PRIMARY HYPERTENSION: ICD-10-CM

## 2024-03-05 DIAGNOSIS — J30.89 NON-SEASONAL ALLERGIC RHINITIS, UNSPECIFIED TRIGGER: ICD-10-CM

## 2024-03-05 DIAGNOSIS — N20.0 KIDNEY STONES: ICD-10-CM

## 2024-03-05 PROCEDURE — 99395 PREV VISIT EST AGE 18-39: CPT | Mod: S$GLB,,, | Performed by: FAMILY MEDICINE

## 2024-03-05 PROCEDURE — 99999 PR PBB SHADOW E&M-EST. PATIENT-LVL III: CPT | Mod: PBBFAC,,, | Performed by: FAMILY MEDICINE

## 2024-03-05 NOTE — PROGRESS NOTES
Subjective:   Patient ID: Vasile Norris is a 34 y.o. male.  Chief Complaint:  Annual Exam    Patient presents for annual physical exam      Last annual physical exam February 2023  Cholesterol tests with elevated triglycerides, otherwise acceptable. 10-year risk of a heart attack or stroke is low.  CBC with normal white blood cell count, red blood cell count, and platelet levels.  Sugar, Kidney, Liver, and Electrolyte tests are all normal.  A1c is in a normal range. No Prediabtes or Diabetes  Thyroid testing is normal.     Medical History:  - Hypertension. Controlled Naya 5/20 mg daily.  Reports compliance.  Denies side effects.  No shortness of breath or swelling.    - Allergic Rhinosinusitis.  Stable on over-the-counter nonsedating antihistamines and Flonase as needed.  Remains off Singulair.  - Migraine headaches.  Sees neurology.  Receives Botox injections. Imitrex and Zofran p.r.n. Still effective.    - Nephrolithiasis.  Sees urology.  Urocit-K for prevention.      Family history for prostate cancer, but no colon cancer  Other than kidney stones, no active  symptoms   Tetanus booster up-to-date  No documented COVID vaccines  No recent flu vaccines  Previous hepatitis-C and HIV screening negative      No complaints or concerns today           Review of Systems   Constitutional:  Negative for activity change, chills, diaphoresis, fatigue, fever and unexpected weight change.   HENT:  Negative for hearing loss, rhinorrhea and trouble swallowing.    Eyes:  Negative for discharge and visual disturbance.   Respiratory:  Negative for cough, chest tightness, shortness of breath and wheezing.    Cardiovascular:  Negative for chest pain, palpitations and leg swelling.   Gastrointestinal:  Negative for abdominal pain, blood in stool, constipation, diarrhea, nausea and vomiting.   Endocrine: Negative for cold intolerance, heat intolerance, polydipsia and polyuria.   Genitourinary:  Negative for difficulty urinating,  "hematuria and urgency.   Musculoskeletal:  Negative for arthralgias, joint swelling, myalgias and neck pain.   Skin:  Negative for rash.   Neurological:  Negative for dizziness, tremors, syncope, weakness, light-headedness, numbness and headaches.   Hematological:  Does not bruise/bleed easily.   Psychiatric/Behavioral:  Negative for confusion, dysphoric mood and sleep disturbance. The patient is not nervous/anxious.        Objective:   /84 (BP Location: Left arm, Patient Position: Sitting, BP Method: Large (Manual))   Pulse 91   Ht 6' 1" (1.854 m)   Wt 112.1 kg (247 lb 2.2 oz)   SpO2 98%   BMI 32.61 kg/m²     Physical Exam  Vitals and nursing note reviewed.   Constitutional:       Appearance: Normal appearance. He is well-developed. He is obese.   HENT:      Right Ear: Tympanic membrane and ear canal normal.      Left Ear: Tympanic membrane and ear canal normal.      Nose: No congestion or rhinorrhea.      Mouth/Throat:      Pharynx: Oropharynx is clear. Uvula midline. No pharyngeal swelling, oropharyngeal exudate or posterior oropharyngeal erythema.   Neck:      Thyroid: No thyroid mass, thyromegaly or thyroid tenderness.   Cardiovascular:      Rate and Rhythm: Normal rate and regular rhythm.      Heart sounds: Normal heart sounds.   Pulmonary:      Effort: Pulmonary effort is normal.      Breath sounds: Normal breath sounds.   Abdominal:      General: There is no distension.      Palpations: Abdomen is soft.      Tenderness: There is no abdominal tenderness.   Skin:     Findings: No rash.   Psychiatric:         Mood and Affect: Mood and affect normal.       Assessment:       ICD-10-CM ICD-9-CM   1. Annual physical exam  Z00.00 V70.0   2. Essential hypertension  I10 401.9   3. Non-seasonal allergic rhinitis, unspecified trigger  J30.89 477.8   4. Migraine without aura and without status migrainosus, not intractable  G43.009 346.10   5. Kidney stones  N20.0 592.0     Plan:   Annual physical exam  -     " CBC Without Differential; Future; Expected date: 03/05/2024  -     Comprehensive Metabolic Panel; Future; Expected date: 03/05/2024  -     Lipid Panel; Future; Expected date: 03/05/2024  -     TSH; Future; Expected date: 03/05/2024  -     Hemoglobin A1C; Future; Expected date: 03/05/2024  Blood pressure normal.  BMI 33.  Remainder exam stable.    Check labs.  Treat as indicated.    Colon cancer screening at 45 years old  Prostate cancer screening at 40 years old  Tetanus booster up-to-date  Declines COVID vaccines  Declines flu vaccine     Essential hypertension  Controlled.  Stable.  Asymptomatic.  BP at goal.    Continue Naya 5/20 mg daily     Non-seasonal allergic rhinitis, unspecified trigger  Symptoms stable and well controlled   Continue over-the-counter nonsedating antihistamine and Flonase as needed     Migraine without aura and without status migrainosus, not intractable  Reports stable pain pattern  Continue Botox   Continue Imitrex and Zofran as needed  Follow-up neurology as scheduled     Kidney stones  Stable   No suspicion for recurrence   Continue Urocit-K   Follow-up neurology as scheduled     Return to clinic 1 year for annual physical exam or sooner if needed

## 2024-03-06 ENCOUNTER — LAB VISIT (OUTPATIENT)
Dept: LAB | Facility: HOSPITAL | Age: 34
End: 2024-03-06
Attending: FAMILY MEDICINE
Payer: COMMERCIAL

## 2024-03-06 DIAGNOSIS — Z00.00 ANNUAL PHYSICAL EXAM: ICD-10-CM

## 2024-03-06 LAB
ALBUMIN SERPL BCP-MCNC: 4 G/DL (ref 3.5–5.2)
ALP SERPL-CCNC: 55 U/L (ref 55–135)
ALT SERPL W/O P-5'-P-CCNC: 19 U/L (ref 10–44)
ANION GAP SERPL CALC-SCNC: 8 MMOL/L (ref 8–16)
AST SERPL-CCNC: 18 U/L (ref 10–40)
BILIRUB SERPL-MCNC: 0.3 MG/DL (ref 0.1–1)
BUN SERPL-MCNC: 14 MG/DL (ref 6–20)
CALCIUM SERPL-MCNC: 9.2 MG/DL (ref 8.7–10.5)
CHLORIDE SERPL-SCNC: 108 MMOL/L (ref 95–110)
CHOLEST SERPL-MCNC: 171 MG/DL (ref 120–199)
CHOLEST/HDLC SERPL: 5 {RATIO} (ref 2–5)
CO2 SERPL-SCNC: 26 MMOL/L (ref 23–29)
CREAT SERPL-MCNC: 1.1 MG/DL (ref 0.5–1.4)
ERYTHROCYTE [DISTWIDTH] IN BLOOD BY AUTOMATED COUNT: 12.2 % (ref 11.5–14.5)
EST. GFR  (NO RACE VARIABLE): >60 ML/MIN/1.73 M^2
ESTIMATED AVG GLUCOSE: 103 MG/DL (ref 68–131)
GLUCOSE SERPL-MCNC: 96 MG/DL (ref 70–110)
HBA1C MFR BLD: 5.2 % (ref 4–5.6)
HCT VFR BLD AUTO: 47 % (ref 40–54)
HDLC SERPL-MCNC: 34 MG/DL (ref 40–75)
HDLC SERPL: 19.9 % (ref 20–50)
HGB BLD-MCNC: 15.7 G/DL (ref 14–18)
LDLC SERPL CALC-MCNC: 113.2 MG/DL (ref 63–159)
MCH RBC QN AUTO: 28.6 PG (ref 27–31)
MCHC RBC AUTO-ENTMCNC: 33.4 G/DL (ref 32–36)
MCV RBC AUTO: 86 FL (ref 82–98)
NONHDLC SERPL-MCNC: 137 MG/DL
PLATELET # BLD AUTO: 271 K/UL (ref 150–450)
PMV BLD AUTO: 11.3 FL (ref 9.2–12.9)
POTASSIUM SERPL-SCNC: 3.9 MMOL/L (ref 3.5–5.1)
PROT SERPL-MCNC: 7.1 G/DL (ref 6–8.4)
RBC # BLD AUTO: 5.48 M/UL (ref 4.6–6.2)
SODIUM SERPL-SCNC: 142 MMOL/L (ref 136–145)
TRIGL SERPL-MCNC: 119 MG/DL (ref 30–150)
TSH SERPL DL<=0.005 MIU/L-ACNC: 1.16 UIU/ML (ref 0.4–4)
WBC # BLD AUTO: 6.4 K/UL (ref 3.9–12.7)

## 2024-03-06 PROCEDURE — 36415 COLL VENOUS BLD VENIPUNCTURE: CPT | Performed by: FAMILY MEDICINE

## 2024-03-06 PROCEDURE — 85027 COMPLETE CBC AUTOMATED: CPT | Performed by: FAMILY MEDICINE

## 2024-03-06 PROCEDURE — 84443 ASSAY THYROID STIM HORMONE: CPT | Performed by: FAMILY MEDICINE

## 2024-03-06 PROCEDURE — 83036 HEMOGLOBIN GLYCOSYLATED A1C: CPT | Performed by: FAMILY MEDICINE

## 2024-03-06 PROCEDURE — 80053 COMPREHEN METABOLIC PANEL: CPT | Performed by: FAMILY MEDICINE

## 2024-03-06 PROCEDURE — 80061 LIPID PANEL: CPT | Performed by: FAMILY MEDICINE

## 2024-05-30 ENCOUNTER — PATIENT MESSAGE (OUTPATIENT)
Dept: INTERNAL MEDICINE | Facility: CLINIC | Age: 34
End: 2024-05-30
Payer: COMMERCIAL

## 2024-05-30 RX ORDER — AMLODIPINE BESYLATE 5 MG/1
5 TABLET ORAL DAILY
Qty: 30 TABLET | Refills: 0 | Status: SHIPPED | OUTPATIENT
Start: 2024-05-30 | End: 2024-06-06

## 2024-06-05 ENCOUNTER — OFFICE VISIT (OUTPATIENT)
Dept: INTERNAL MEDICINE | Facility: CLINIC | Age: 34
End: 2024-06-05
Payer: COMMERCIAL

## 2024-06-05 ENCOUNTER — LAB VISIT (OUTPATIENT)
Dept: LAB | Facility: HOSPITAL | Age: 34
End: 2024-06-05
Attending: FAMILY MEDICINE
Payer: COMMERCIAL

## 2024-06-05 VITALS
HEIGHT: 73 IN | SYSTOLIC BLOOD PRESSURE: 122 MMHG | DIASTOLIC BLOOD PRESSURE: 80 MMHG | RESPIRATION RATE: 18 BRPM | BODY MASS INDEX: 33.17 KG/M2 | OXYGEN SATURATION: 98 % | TEMPERATURE: 98 F | WEIGHT: 250.25 LBS | HEART RATE: 100 BPM

## 2024-06-05 DIAGNOSIS — I10 PRIMARY HYPERTENSION: Chronic | ICD-10-CM

## 2024-06-05 DIAGNOSIS — N17.9 AKI (ACUTE KIDNEY INJURY): ICD-10-CM

## 2024-06-05 DIAGNOSIS — N20.0 KIDNEY STONES: Chronic | ICD-10-CM

## 2024-06-05 DIAGNOSIS — E86.0 DEHYDRATION: ICD-10-CM

## 2024-06-05 DIAGNOSIS — Z09 HOSPITAL DISCHARGE FOLLOW-UP: Primary | ICD-10-CM

## 2024-06-05 PROBLEM — N20.1 URETERAL STONE: Status: ACTIVE | Noted: 2024-05-27

## 2024-06-05 LAB
ALBUMIN SERPL BCP-MCNC: 4 G/DL (ref 3.5–5.2)
ANION GAP SERPL CALC-SCNC: 8 MMOL/L (ref 8–16)
BUN SERPL-MCNC: 17 MG/DL (ref 6–20)
CALCIUM SERPL-MCNC: 9.7 MG/DL (ref 8.7–10.5)
CHLORIDE SERPL-SCNC: 107 MMOL/L (ref 95–110)
CO2 SERPL-SCNC: 26 MMOL/L (ref 23–29)
CREAT SERPL-MCNC: 1 MG/DL (ref 0.5–1.4)
EST. GFR  (NO RACE VARIABLE): >60 ML/MIN/1.73 M^2
GLUCOSE SERPL-MCNC: 88 MG/DL (ref 70–110)
PHOSPHATE SERPL-MCNC: 2.6 MG/DL (ref 2.7–4.5)
POTASSIUM SERPL-SCNC: 3.8 MMOL/L (ref 3.5–5.1)
SODIUM SERPL-SCNC: 141 MMOL/L (ref 136–145)

## 2024-06-05 PROCEDURE — 80069 RENAL FUNCTION PANEL: CPT | Performed by: FAMILY MEDICINE

## 2024-06-05 PROCEDURE — 36415 COLL VENOUS BLD VENIPUNCTURE: CPT | Performed by: FAMILY MEDICINE

## 2024-06-05 PROCEDURE — 99999 PR PBB SHADOW E&M-EST. PATIENT-LVL IV: CPT | Mod: PBBFAC,,, | Performed by: FAMILY MEDICINE

## 2024-06-05 PROCEDURE — 99495 TRANSJ CARE MGMT MOD F2F 14D: CPT | Mod: S$GLB,,, | Performed by: FAMILY MEDICINE

## 2024-06-05 RX ORDER — OXYCODONE AND ACETAMINOPHEN 5; 325 MG/1; MG/1
1 TABLET ORAL
COMMUNITY
Start: 2024-05-29

## 2024-06-05 RX ORDER — KETOCONAZOLE 20 MG/ML
SHAMPOO, SUSPENSION TOPICAL
COMMUNITY
Start: 2024-03-25

## 2024-06-05 RX ORDER — TAMSULOSIN HYDROCHLORIDE 0.4 MG/1
0.4 CAPSULE ORAL
COMMUNITY
Start: 2024-05-29 | End: 2024-06-28

## 2024-06-05 NOTE — PROGRESS NOTES
"Subjective:   Patient ID: Vasile Norris is a 34 y.o. male.  Chief Complaint:  Hospital Follow Up and Transitional Care    Presents for hospital discharge follow-up     Admitted [facility] 5/27/2024 through 5/29/2024  Left flank pain due to recurrent kidney stones with acute kidney injury due to hydration and mild hyperkalemia  NSAIDs discontinued   IV fluids   CT confirmed recurrent stones   Underwent lithotripsy without complication   Good resolution of pain   Initial creatinine 2.04.  Repeat creatinine 1.40  At discharge had Naya blood pressure medication and Urocit-K medication held until could obtain repeat renal function panel   Added Flomax to help pass stones post lithotripsy  Has urology visit scheduled for next week    Has done well since discharge   No recurrent flank pain   No other constitutional symptoms   Started plain amlodipine 5 mg daily in the interim to help control blood pressure    No new complaints today        Review of Systems   Constitutional:  Negative for fatigue.   Eyes:  Negative for visual disturbance.   Respiratory:  Negative for cough, chest tightness and shortness of breath.    Cardiovascular:  Negative for chest pain, palpitations and leg swelling.   Gastrointestinal:  Negative for abdominal pain, diarrhea, nausea and vomiting.   Genitourinary:  Negative for decreased urine volume, difficulty urinating, dysuria, enuresis, flank pain, frequency, genital sores, hematuria, penile discharge, penile pain, penile swelling, scrotal swelling, testicular pain and urgency.   Musculoskeletal:  Negative for myalgias and neck pain.   Skin:  Negative for rash.   Neurological:  Negative for dizziness, syncope, weakness, light-headedness and headaches.   Psychiatric/Behavioral:  Negative for sleep disturbance.        Objective:   /80 (BP Location: Right arm, Patient Position: Sitting, BP Method: Large (Manual))   Pulse 100   Temp 97.7 °F (36.5 °C) (Tympanic)   Resp 18   Ht 6' 1" (1.854 m)   " Wt 113.5 kg (250 lb 3.6 oz)   SpO2 98%   BMI 33.01 kg/m²     Physical Exam  Vitals and nursing note reviewed.   Constitutional:       Appearance: Normal appearance. He is well-developed. He is obese.   Cardiovascular:      Rate and Rhythm: Normal rate and regular rhythm.   Pulmonary:      Effort: Pulmonary effort is normal.   Musculoskeletal:      Right lower leg: No edema.      Left lower leg: No edema.   Skin:     Findings: No rash.   Psychiatric:         Mood and Affect: Mood and affect normal.       Assessment:       ICD-10-CM ICD-9-CM   1. Hospital discharge follow-up  Z09 V67.59   2. BRYAN (acute kidney injury)  N17.9 584.9   3. Dehydration  E86.0 276.51   4. Kidney stones  N20.0 592.0   5. Primary hypertension  I10 401.9     Plan:   Hospital discharge follow-up  BRYAN (acute kidney injury)  Dehydration  Kidney stones  -     Renal Function Panel; Future; Expected date: 06/05/2024  Recheck renal function panel today   Continue Flomax   If potassium normal, restart Urocit-K if desired by Urology   Follow-up with Urology next week as scheduled     Primary hypertension  Controlled.  Stable.  Asymptomatic.  BP at goal.    If renal function panel normal, restart Naya 5/20 mg daily    Return to clinic 9 months for annual physical exam or sooner as needed    Transitional Care Note    Family and/or Caretaker present at visit?  No.  Diagnostic tests reviewed/disposition: I have reviewed all completed as well as pending diagnostic tests at the time of discharge.  Disease/illness education: Kidney Stones, hydronephrosis, acute kidney injury  Home health/community services discussion/referrals: Patient does not have home health established from hospital visit.  They do not need home health.  If needed, we will set up home health for the patient.   Establishment or re-establishment of referral orders for community resources: No other necessary community resources.   Discussion with other health care providers: No discussion  with other health care providers necessary.

## 2024-06-26 RX ORDER — AMLODIPINE BESYLATE 5 MG/1
5 TABLET ORAL
Qty: 30 TABLET | Refills: 0 | OUTPATIENT
Start: 2024-06-26

## 2024-06-26 NOTE — TELEPHONE ENCOUNTER
No care due was identified.  [facility] Embedded Care Due Messages. Reference number: [id number].   6/26/2024 3:03:29 PM CDT

## 2024-06-26 NOTE — TELEPHONE ENCOUNTER
Refill Decision Note   Vasile Norris  is requesting a refill authorization.  Brief Assessment and Rationale for Refill:  Quick Discontinue     Medication Therapy Plan:  amlodipine therapy replaced with july    Medication Reconciliation Completed: No   Comments:     No Care Gaps recommended.     Note composed:3:38 PM 06/26/2024

## 2024-07-04 ENCOUNTER — PATIENT MESSAGE (OUTPATIENT)
Dept: INTERNAL MEDICINE | Facility: CLINIC | Age: 34
End: 2024-07-04
Payer: COMMERCIAL

## 2024-07-10 ENCOUNTER — OFFICE VISIT (OUTPATIENT)
Dept: INTERNAL MEDICINE | Facility: CLINIC | Age: 34
End: 2024-07-10
Payer: COMMERCIAL

## 2024-07-10 DIAGNOSIS — Z71.84 TRAVEL ADVICE ENCOUNTER: Primary | ICD-10-CM

## 2024-07-10 PROCEDURE — 99213 OFFICE O/P EST LOW 20 MIN: CPT | Mod: 95,,, | Performed by: FAMILY MEDICINE

## 2024-07-10 RX ORDER — CIPROFLOXACIN 500 MG/1
500 TABLET ORAL 2 TIMES DAILY
Qty: 6 TABLET | Refills: 0 | Status: SHIPPED | OUTPATIENT
Start: 2024-07-10 | End: 2024-07-13

## 2024-07-10 NOTE — PROGRESS NOTES
Subjective:   Patient ID: Vasile Norris is a 34 y.o. male.  Chief Complaint:  Travel Advice    The patient location is: Home  The chief complaint leading to consultation is: Travel Advice Encounter    Visit type: audiovisual    Face to Face time with patient: 15 minutes  20 minutes of total time spent on the encounter, which includes face to face time and non-face to face time preparing to see the patient (eg, review of tests), Obtaining and/or reviewing separately obtained history, Documenting clinical information in the electronic or other health record, Independently interpreting results (not separately reported) and communicating results to the patient/family/caregiver, or Care coordination (not separately reported).     Each patient to whom he or she provides medical services by telemedicine is:  (1) informed of the relationship between the physician and patient and the respective role of any other health care provider with respect to management of the patient; and (2) notified that he or she may decline to receive medical services by telemedicine and may withdraw from such care at any time.    Presents to review travel recommendations for [place]   Also needs refill of Cipro for traveler's diarrhea  Currently asymptomatic and without suspicion for any underlying infection      Review of Systems   Constitutional:  Negative for activity change and unexpected weight change.   HENT:  Negative for hearing loss, rhinorrhea and trouble swallowing.    Eyes:  Negative for discharge and visual disturbance.   Respiratory:  Negative for chest tightness and wheezing.    Cardiovascular:  Negative for chest pain and palpitations.   Gastrointestinal:  Negative for blood in stool, constipation, diarrhea and vomiting.   Endocrine: Negative for polydipsia and polyuria.   Genitourinary:  Negative for difficulty urinating, hematuria and urgency.   Musculoskeletal:  Negative for arthralgias, joint swelling and neck pain.    Neurological:  Negative for weakness and headaches.   Psychiatric/Behavioral:  Negative for confusion and dysphoric mood.      Objective:     Physical Exam  Constitutional:       Appearance: Normal appearance.   Psychiatric:         Mood and Affect: Mood and affect normal.       Assessment:       ICD-10-CM ICD-9-CM   1. Travel advice encounter  Z71.84 V65.49     Plan:   Travel advice encounter  -     ciprofloxacin HCl (CIPRO) 500 MG tablet; Take 1 tablet (500 mg total) by mouth 2 (two) times a day. for 3 days  Dispense: 6 tablet; Refill: 0    Reviewed in detail trial health recommendations from [facility] website   For dengue fever protection, needs appropriate insect repellent and mosquito control   For measles, reports completed to MMR vaccines in the past   Documented up-to-date Tetanus booster, flu vaccine, hepatitis a vaccines   Reports up-to-date hepatitis-B and varicella vaccines  Recommended but declines COVID vaccine  Documented yellow fever vaccine  Documented meningitis vaccine  Staying in [place] where cholera, malaria, typhoid vaccine not recommended  Staying less than 6 months, so Chikungunya vaccine not recommended  Provide a prescription for Cipro if develops traveler's diarrhea   Return to clinic as scheduled/as needed    20 minutes of total time spent on the encounter, which includes face to face time and non-face to face time preparing to see the patient (eg, review of tests), Obtaining and/or reviewing separately obtained history, documenting clinical information in the electronic or other health record, independently interpreting results (not separately reported) and communicating results to the patient/family/caregiver, or Care coordination (not separately reported).

## 2024-08-01 RX ORDER — AMLODIPINE BESYLATE 5 MG/1
5 TABLET ORAL
Qty: 30 TABLET | Refills: 0 | OUTPATIENT
Start: 2024-08-01

## 2024-08-01 NOTE — TELEPHONE ENCOUNTER
No care due was identified.  [facility] Embedded Care Due Messages. Reference number: [id number].   8/01/2024 7:20:05 AM CDT

## 2024-08-01 NOTE — TELEPHONE ENCOUNTER
Refill Decision Note   Vasile Norris  is requesting a refill authorization.  Brief Assessment and Rationale for Refill:  Quick Discontinue     Medication Therapy Plan:  Replaced with ANUP. The original prescription was discontinued on 6/6/2024 by Edinson Dominguez MD.      Comments:     Note composed:12:35 PM 08/01/2024

## 2024-09-09 PROBLEM — N17.9 AKI (ACUTE KIDNEY INJURY): Status: RESOLVED | Noted: 2024-05-27 | Resolved: 2024-09-09

## 2024-11-21 ENCOUNTER — PATIENT MESSAGE (OUTPATIENT)
Dept: INTERNAL MEDICINE | Facility: CLINIC | Age: 34
End: 2024-11-21
Payer: COMMERCIAL

## 2024-11-21 DIAGNOSIS — J11.1 INFLUENZA: Primary | ICD-10-CM

## 2024-11-21 RX ORDER — OSELTAMIVIR PHOSPHATE 75 MG/1
75 CAPSULE ORAL 2 TIMES DAILY
Qty: 10 CAPSULE | Refills: 0 | Status: SHIPPED | OUTPATIENT
Start: 2024-11-21 | End: 2024-11-26

## 2025-02-13 DIAGNOSIS — I10 ESSENTIAL HYPERTENSION: ICD-10-CM

## 2025-02-13 RX ORDER — AMLODIPINE AND OLMESARTAN MEDOXOMIL 5; 20 MG/1; MG/1
1 TABLET ORAL
Qty: 90 TABLET | Refills: 1 | Status: SHIPPED | OUTPATIENT
Start: 2025-02-13

## 2025-02-13 NOTE — TELEPHONE ENCOUNTER
Refill Decision Note   Vasile Norris  is requesting a refill authorization.  Brief Assessment and Rationale for Refill:  Approve     Medication Therapy Plan:         Comments:     Note composed:10:40 AM 02/13/2025

## 2025-02-13 NOTE — TELEPHONE ENCOUNTER
No care due was identified.  [facility] Embedded Care Due Messages. Reference number: [id number].   2/13/2025 1:13:30 AM CST

## 2025-02-18 ENCOUNTER — PATIENT MESSAGE (OUTPATIENT)
Dept: INTERNAL MEDICINE | Facility: CLINIC | Age: 35
End: 2025-02-18
Payer: COMMERCIAL

## 2025-02-24 ENCOUNTER — LAB VISIT (OUTPATIENT)
Dept: LAB | Facility: HOSPITAL | Age: 35
End: 2025-02-24
Attending: FAMILY MEDICINE
Payer: COMMERCIAL

## 2025-02-24 ENCOUNTER — OFFICE VISIT (OUTPATIENT)
Dept: INTERNAL MEDICINE | Facility: CLINIC | Age: 35
End: 2025-02-24
Payer: COMMERCIAL

## 2025-02-24 VITALS
SYSTOLIC BLOOD PRESSURE: 128 MMHG | OXYGEN SATURATION: 98 % | DIASTOLIC BLOOD PRESSURE: 76 MMHG | HEART RATE: 102 BPM | WEIGHT: 243.81 LBS | BODY MASS INDEX: 32.31 KG/M2 | HEIGHT: 73 IN

## 2025-02-24 DIAGNOSIS — Z00.00 ANNUAL PHYSICAL EXAM: Primary | ICD-10-CM

## 2025-02-24 DIAGNOSIS — K21.9 GASTROESOPHAGEAL REFLUX DISEASE WITHOUT ESOPHAGITIS: ICD-10-CM

## 2025-02-24 DIAGNOSIS — I10 PRIMARY HYPERTENSION: Chronic | ICD-10-CM

## 2025-02-24 DIAGNOSIS — Z00.00 ANNUAL PHYSICAL EXAM: ICD-10-CM

## 2025-02-24 DIAGNOSIS — E66.811 OBESITY (BMI 30.0-34.9): ICD-10-CM

## 2025-02-24 DIAGNOSIS — N20.0 KIDNEY STONES: Chronic | ICD-10-CM

## 2025-02-24 DIAGNOSIS — Z02.9 ADMINISTRATIVE ENCOUNTER: ICD-10-CM

## 2025-02-24 DIAGNOSIS — Z23 NEED FOR INFLUENZA VACCINATION: ICD-10-CM

## 2025-02-24 DIAGNOSIS — G43.009 MIGRAINE WITHOUT AURA AND WITHOUT STATUS MIGRAINOSUS, NOT INTRACTABLE: Chronic | ICD-10-CM

## 2025-02-24 PROBLEM — N20.1 URETERAL STONE: Chronic | Status: ACTIVE | Noted: 2024-05-27

## 2025-02-24 LAB
ALBUMIN SERPL BCP-MCNC: 4.3 G/DL (ref 3.5–5.2)
ALP SERPL-CCNC: 56 U/L (ref 40–150)
ALT SERPL W/O P-5'-P-CCNC: 19 U/L (ref 10–44)
ANION GAP SERPL CALC-SCNC: 14 MMOL/L (ref 8–16)
AST SERPL-CCNC: 26 U/L (ref 10–40)
BILIRUB SERPL-MCNC: 0.4 MG/DL (ref 0.1–1)
BUN SERPL-MCNC: 13 MG/DL (ref 6–20)
CALCIUM SERPL-MCNC: 9.9 MG/DL (ref 8.7–10.5)
CHLORIDE SERPL-SCNC: 103 MMOL/L (ref 95–110)
CHOLEST SERPL-MCNC: 167 MG/DL (ref 120–199)
CHOLEST/HDLC SERPL: 4.4 {RATIO} (ref 2–5)
CO2 SERPL-SCNC: 25 MMOL/L (ref 23–29)
CREAT SERPL-MCNC: 1 MG/DL (ref 0.5–1.4)
ERYTHROCYTE [DISTWIDTH] IN BLOOD BY AUTOMATED COUNT: 13 % (ref 11.5–14.5)
EST. GFR  (NO RACE VARIABLE): >60 ML/MIN/1.73 M^2
ESTIMATED AVG GLUCOSE: 103 MG/DL (ref 68–131)
GLUCOSE SERPL-MCNC: 80 MG/DL (ref 70–110)
HBA1C MFR BLD: 5.2 % (ref 4–5.6)
HCT VFR BLD AUTO: 50.5 % (ref 40–54)
HDLC SERPL-MCNC: 38 MG/DL (ref 40–75)
HDLC SERPL: 22.8 % (ref 20–50)
HGB BLD-MCNC: 16.3 G/DL (ref 14–18)
LDLC SERPL CALC-MCNC: 99.8 MG/DL (ref 63–159)
MCH RBC QN AUTO: 28.1 PG (ref 27–31)
MCHC RBC AUTO-ENTMCNC: 32.3 G/DL (ref 32–36)
MCV RBC AUTO: 87 FL (ref 82–98)
NONHDLC SERPL-MCNC: 129 MG/DL
PLATELET # BLD AUTO: 322 K/UL (ref 150–450)
PMV BLD AUTO: 11.2 FL (ref 9.2–12.9)
POTASSIUM SERPL-SCNC: 4 MMOL/L (ref 3.5–5.1)
PROT SERPL-MCNC: 7.6 G/DL (ref 6–8.4)
RBC # BLD AUTO: 5.8 M/UL (ref 4.6–6.2)
SODIUM SERPL-SCNC: 142 MMOL/L (ref 136–145)
TRIGL SERPL-MCNC: 146 MG/DL (ref 30–150)
TSH SERPL DL<=0.005 MIU/L-ACNC: 1.12 UIU/ML (ref 0.4–4)
WBC # BLD AUTO: 6.51 K/UL (ref 3.9–12.7)

## 2025-02-24 PROCEDURE — 80061 LIPID PANEL: CPT | Performed by: FAMILY MEDICINE

## 2025-02-24 PROCEDURE — 83036 HEMOGLOBIN GLYCOSYLATED A1C: CPT | Performed by: FAMILY MEDICINE

## 2025-02-24 PROCEDURE — 80053 COMPREHEN METABOLIC PANEL: CPT | Performed by: FAMILY MEDICINE

## 2025-02-24 PROCEDURE — 85027 COMPLETE CBC AUTOMATED: CPT | Performed by: FAMILY MEDICINE

## 2025-02-24 PROCEDURE — 84443 ASSAY THYROID STIM HORMONE: CPT | Performed by: FAMILY MEDICINE

## 2025-02-24 PROCEDURE — 99999 PR PBB SHADOW E&M-EST. PATIENT-LVL III: CPT | Mod: PBBFAC,,, | Performed by: FAMILY MEDICINE

## 2025-02-24 RX ORDER — FAMOTIDINE 40 MG/1
40 TABLET, FILM COATED ORAL DAILY
Qty: 30 TABLET | Refills: 11 | Status: SHIPPED | OUTPATIENT
Start: 2025-02-24 | End: 2026-02-24

## 2025-02-24 NOTE — PROGRESS NOTES
Subjective:   Patient ID: Vasile Norris is a 35 y.o. male.  Chief Complaint:  Annual Exam    Patient presents for annual physical exam      Last annual physical exam March 2024  Lipid panel with low HDL, but overall 10 year risk of cardiovascular events < 1%  CBC with normal white blood cell count, red blood cell count, and platelet levels.  Sugar, Kidney, Liver, and Electrolyte tests are all normal.  A1c is in a normal range. No Prediabtes or Diabetes  Thyroid testing is normal.     Medical History:  - Hypertension. Controlled Naya 5/20 mg daily.  Reports compliance.  Denies side effects.  No shortness of breath or swelling.    - Allergic Rhinosinusitis.  Stable on over-the-counter nonsedating antihistamines and Flonase as needed.  Remains off Singulair.  - Migraine headaches.  Sees neurology.  Receives Botox injections. Imitrex and Zofran p.r.n. Still effective.    - Nephrolithiasis.  Sees urology.  Urocit-K for prevention.  Have more recurrent attack that required hospitalization over past year.  Currently asymptomatic.     Family history for prostate cancer, but no colon cancer  Other than kidney stones, no active  symptoms   Tetanus booster up-to-date  No documented COVID vaccines  No recent flu vaccines  Previous hepatitis-C and HIV screening negative     Excited with wife expecting a child in the upcoming months.  Needs FMLA form for paternity leave completed.      Concern today because dentist advised he was having dental caries and worried down of his due to reflux  Recommended need discuss with me treatment   Currently not on any proton pump inhibitor or H2 blocker   Does take multiple times daily for symptom control    Review of Systems   Constitutional:  Negative for activity change, chills, fatigue, fever and unexpected weight change.   HENT:  Positive for dental problem. Negative for congestion, ear discharge, ear pain, hearing loss, mouth sores, postnasal drip, rhinorrhea, sinus pressure, sinus  "pain, sore throat and trouble swallowing.    Eyes:  Negative for pain, discharge, redness and visual disturbance.   Respiratory:  Negative for cough, chest tightness, shortness of breath and wheezing.    Cardiovascular:  Negative for chest pain, palpitations and leg swelling.   Gastrointestinal:  Positive for abdominal pain (Heartburn). Negative for blood in stool, constipation, diarrhea, nausea and vomiting.   Endocrine: Negative for polydipsia, polyphagia and polyuria.   Genitourinary:  Negative for difficulty urinating, dysuria, flank pain, frequency, hematuria and urgency.   Musculoskeletal:  Negative for arthralgias, joint swelling, myalgias and neck pain.   Skin:  Negative for rash.   Neurological:  Negative for dizziness, syncope, weakness, light-headedness and headaches.   Hematological:  Negative for adenopathy.   Psychiatric/Behavioral:  Negative for confusion, dysphoric mood and sleep disturbance. The patient is not nervous/anxious.      Objective:   /76 (BP Location: Left arm, Patient Position: Sitting)   Pulse 102   Ht 6' 1" (1.854 m)   Wt 110.6 kg (243 lb 13.3 oz)   SpO2 98%   BMI 32.17 kg/m²     Physical Exam  Vitals and nursing note reviewed.   Constitutional:       Appearance: Normal appearance. He is well-developed. He is obese.   HENT:      Right Ear: Tympanic membrane and ear canal normal.      Left Ear: Tympanic membrane and ear canal normal.      Nose: No congestion or rhinorrhea.      Mouth/Throat:      Pharynx: Oropharynx is clear. Uvula midline. No pharyngeal swelling, oropharyngeal exudate or posterior oropharyngeal erythema.   Neck:      Thyroid: No thyroid mass, thyromegaly or thyroid tenderness.   Cardiovascular:      Rate and Rhythm: Normal rate and regular rhythm.      Heart sounds: Normal heart sounds.   Pulmonary:      Effort: Pulmonary effort is normal.      Breath sounds: Normal breath sounds.   Abdominal:      General: There is no distension.      Palpations: Abdomen is " soft.      Tenderness: There is no abdominal tenderness.   Skin:     Findings: No rash.   Psychiatric:         Mood and Affect: Mood and affect normal.       Assessment:       ICD-10-CM ICD-9-CM   1. Annual physical exam  Z00.00 V70.0   2. Need for influenza vaccination  Z23 V04.81   3. Primary hypertension  I10 401.9   4. Gastroesophageal reflux disease without esophagitis  K21.9 530.81   5. Kidney stones  N20.0 592.0   6. Migraine without aura and without status migrainosus, not intractable  G43.009 346.10   7. Obesity (BMI 30.0-34.9)  E66.811 278.00   8. Administrative encounter  Z02.9 V68.9     Plan:   Annual ph  Need for influenza vaccination  -     CBC Without Differential; Future; Expected date: 02/24/2025  -     Comprehensive Metabolic Panel; Future; Expected date: 02/24/2025  -     Lipid Panel; Future; Expected date: 02/24/2025  -     TSH; Future; Expected date: 02/24/2025  -     Hemoglobin A1C; Future; Expected date: 02/24/2025  -     influenza (Flulaval, Fluzone, Fluarix) 45 mcg/0.5 mL IM vaccine (> or = 6 mo) 0.5 mL  Blood pressure normal.  BMI 32.  Overall exam stable.    Check labs.  Treat as indicated.  Colon cancer screening at 45 years old   Prostate cancer screening at 40 years old   Tetanus booster up-to-date   Declines COVID vaccines   Flu vaccine today    Primary hypertension  Controlled.  Stable.  Asymptomatic.  BP at goal.    Continue Naya 5/20 mg daily     Gastroesophageal reflux disease without esophagitis  -     famotidine (PEPCID) 40 MG tablet; Take 1 tablet (40 mg total) by mouth once daily.  Dispense: 30 tablet; Refill: 11  Based on dental caries and recommendation from dentist, start medication for GERD  Start Pepcid 40 mg daily   Adjust dose as indicated     Kidney stones  Currently not an active problem   Up-to-date on urology follow-up   Continue Urocit-K     Migraine without aura and without status migrainosus, not intractable  Pain pattern stable and acceptable   Continue per  Neurology     Obesity (BMI 30.0-34.9)  Discussed increased BMI, Increased health risks, Need for weight loss, Lifestyle modifications.    Administrative encounter   FMLA form completed for upcoming paternity leave     Return to clinic 1 year for annual exam or sooner as needed

## 2025-02-25 ENCOUNTER — RESULTS FOLLOW-UP (OUTPATIENT)
Dept: INTERNAL MEDICINE | Facility: CLINIC | Age: 35
End: 2025-02-25

## 2025-05-05 ENCOUNTER — PATIENT MESSAGE (OUTPATIENT)
Dept: INTERNAL MEDICINE | Facility: CLINIC | Age: 35
End: 2025-05-05
Payer: COMMERCIAL

## 2025-06-25 ENCOUNTER — OFFICE VISIT (OUTPATIENT)
Dept: INTERNAL MEDICINE | Facility: CLINIC | Age: 35
End: 2025-06-25
Payer: COMMERCIAL

## 2025-06-25 VITALS
BODY MASS INDEX: 32.4 KG/M2 | HEART RATE: 82 BPM | OXYGEN SATURATION: 95 % | DIASTOLIC BLOOD PRESSURE: 86 MMHG | HEIGHT: 73 IN | TEMPERATURE: 99 F | WEIGHT: 244.5 LBS | SYSTOLIC BLOOD PRESSURE: 120 MMHG

## 2025-06-25 DIAGNOSIS — J01.00 ACUTE MAXILLARY SINUSITIS, RECURRENCE NOT SPECIFIED: Primary | ICD-10-CM

## 2025-06-25 PROCEDURE — 99214 OFFICE O/P EST MOD 30 MIN: CPT | Mod: S$GLB,,, | Performed by: NURSE PRACTITIONER

## 2025-06-25 PROCEDURE — 99999 PR PBB SHADOW E&M-EST. PATIENT-LVL IV: CPT | Mod: PBBFAC,,, | Performed by: NURSE PRACTITIONER

## 2025-06-25 RX ORDER — AMOXICILLIN AND CLAVULANATE POTASSIUM 875; 125 MG/1; MG/1
1 TABLET, FILM COATED ORAL EVERY 12 HOURS
Qty: 14 TABLET | Refills: 0 | Status: SHIPPED | OUTPATIENT
Start: 2025-06-25

## 2025-06-25 RX ORDER — PROMETHAZINE HYDROCHLORIDE AND DEXTROMETHORPHAN HYDROBROMIDE 6.25; 15 MG/5ML; MG/5ML
5 SYRUP ORAL NIGHTLY PRN
Qty: 118 ML | Refills: 0 | Status: SHIPPED | OUTPATIENT
Start: 2025-06-25 | End: 2025-07-19

## 2025-06-25 RX ORDER — TRAMADOL HYDROCHLORIDE 50 MG/1
50 TABLET, FILM COATED ORAL 2 TIMES DAILY PRN
COMMUNITY
Start: 2025-06-17 | End: 2025-06-27

## 2025-06-25 RX ORDER — METHOCARBAMOL 500 MG/1
TABLET, FILM COATED ORAL
COMMUNITY

## 2025-06-25 RX ORDER — PROMETHAZINE HYDROCHLORIDE AND DEXTROMETHORPHAN HYDROBROMIDE 6.25; 15 MG/5ML; MG/5ML
5 SYRUP ORAL EVERY 4 HOURS PRN
Qty: 118 ML | Refills: 0 | Status: SHIPPED | OUTPATIENT
Start: 2025-06-25 | End: 2025-06-25

## 2025-06-25 RX ORDER — DICLOFENAC SODIUM 75 MG/1
TABLET, DELAYED RELEASE ORAL
COMMUNITY

## 2025-06-25 NOTE — PROGRESS NOTES
"  Patient ID: Vasile Norris is a 35 y.o. male.    Chief Complaint: Cough (Productive for over a week), Nasal Congestion, and Ear Fullness    History of Present Illness    CHIEF COMPLAINT:  Patient presents today for upper respiratory symptoms.    HISTORY OF PRESENT ILLNESS:  He reports upper respiratory symptoms that started over the weekend and have been progressively worsening. Current symptoms include ear pain that began yesterday, characterized by constant fullness and pressure. He experiences severe nasal drip with excessive mucus production, persistent coughing, morning nausea, headache, and sinus congestion with pressure localized to specific facial areas. He denies fever, difficulty swallowing, and pain with swallowing. He notes the ear symptoms are a new development compared to his other ongoing respiratory symptoms. He experiences annual upper respiratory or sinus infections that typically affect his ears.    CURRENT MEDICATIONS:  He is currently taking Robitussin for cough with minimal therapeutic effect and Mucinex with partial relief, though it causes increased coughing due to dryness. He denies use of saline nasal spray for symptom management.    MEDICATION HISTORY:  He reports previous experience with prednisone that caused increased pulse rate, feeling hot and sweaty, and made him feel "amped" without particular benefit. Due to these side effects, he typically declines steroid treatment.      ROS:  General: -fever, -chills, -fatigue, -weight gain, -weight loss  Eyes: -vision changes, -redness, -discharge  ENT: +ear pain, +nasal congestion, -sore throat, +ear pressure, +post nasal drip, +sinus pressure  Cardiovascular: -chest pain, -palpitations, -lower extremity edema  Respiratory: +cough, -shortness of breath  Gastrointestinal: -abdominal pain, +nausea, -vomiting, -diarrhea, -constipation, -blood in stool  Genitourinary: -dysuria, -hematuria, -frequency  Musculoskeletal: -joint pain, -muscle " pain  Skin: -rash, -lesion  Neurological: +headache, -dizziness, -numbness, -tingling  Psychiatric: -anxiety, -depression, -sleep difficulty  Head: +head pain         Physical Exam    General: No acute distress. Well-developed. Well-nourished.  Eyes: EOMI. Sclerae anicteric.  HENT: Normocephalic. Atraumatic. Nares patent. Moist oral mucosa.  Ears: Fluid behind tympanic membranes bilaterally. Ears inflamed bilaterally. Sinus congestion/swolllen nasal mucosa with erythema  Cardiovascular: Regular rate. Regular rhythm. No murmurs. No rubs. No gallops. Normal S1, S2.  Neurological: Alert & oriented x3. No slurred speech. Normal gait.  Psychiatric: Normal mood. Normal affect. Good insight. Good judgment.  Skin: Warm. Dry. No rash.         Assessment & Plan    J01.90 Acute sinusitis, unspecified  H66.93 Otitis media, unspecified, bilateral    ACUTE SINUSITIS:  - Diagnosed sinus infection with pressure, congestion, and constant fullness in sinuses.  - Physical exam confirmed the condition, showing fluid behind the ear drum and redness.  - Prescribed Augmentin for treatment.  - Recommend continued use of OTC medications (Mucinex, Robitussin) with addition of saline nasal spray to help with dryness.    BILATERAL OTITIS MEDIA:  - Diagnosed bilateral otitis media with fluid behind ear drums and redness in both ears, confirmed through physical exam.  - Patient reports ear pain and fullness.  - Treatment will be covered by the Augmentin prescribed for the sinus infection.    GENERAL CARE:  - Patient to increase fluid intake.  - Started prescription-strength cough suppressant syrup to address persistent cough.  - Instructed patient to contact the office if symptoms worsen or fail to improve despite medication, or if fever, worsening pressure, or pain develops.            This note was generated with the assistance of ambient listening technology. Verbal consent was obtained by the patient and accompanying visitor(s) for the  recording of patient appointment to facilitate this note. I attest to having reviewed and edited the generated note for accuracy, though some syntax or spelling errors may persist. Please contact the author of this note for any clarification.

## 2025-08-17 DIAGNOSIS — I10 ESSENTIAL HYPERTENSION: ICD-10-CM

## 2025-08-17 RX ORDER — AMLODIPINE BESYLATE AND OLMESARTAN MEDOXOMIL 5; 20 MG/1; MG/1
1 TABLET ORAL
Qty: 90 TABLET | Refills: 1 | Status: SHIPPED | OUTPATIENT
Start: 2025-08-17